# Patient Record
Sex: FEMALE | Race: WHITE | NOT HISPANIC OR LATINO
[De-identification: names, ages, dates, MRNs, and addresses within clinical notes are randomized per-mention and may not be internally consistent; named-entity substitution may affect disease eponyms.]

---

## 2017-02-28 ENCOUNTER — APPOINTMENT (OUTPATIENT)
Dept: ENDOCRINOLOGY | Facility: CLINIC | Age: 57
End: 2017-02-28

## 2017-02-28 VITALS
WEIGHT: 234 LBS | HEART RATE: 81 BPM | BODY MASS INDEX: 40.95 KG/M2 | OXYGEN SATURATION: 96 % | SYSTOLIC BLOOD PRESSURE: 118 MMHG | DIASTOLIC BLOOD PRESSURE: 80 MMHG | HEIGHT: 63.5 IN

## 2017-02-28 LAB
GLUCOSE BLDC GLUCOMTR-MCNC: 203
HBA1C MFR BLD HPLC: 6.4

## 2017-03-03 LAB
25(OH)D3 SERPL-MCNC: 22.1 NG/ML
ALBUMIN SERPL ELPH-MCNC: 4.8 G/DL
ALP BLD-CCNC: 81 U/L
ALT SERPL-CCNC: 27 U/L
ANION GAP SERPL CALC-SCNC: 18 MMOL/L
AST SERPL-CCNC: 17 U/L
BILIRUB SERPL-MCNC: 0.3 MG/DL
BUN SERPL-MCNC: 15 MG/DL
CALCIUM SERPL-MCNC: 10.3 MG/DL
CALCIUM SERPL-MCNC: 10.3 MG/DL
CHLORIDE SERPL-SCNC: 100 MMOL/L
CHOLEST SERPL-MCNC: 186 MG/DL
CHOLEST/HDLC SERPL: 3.9 RATIO
CO2 SERPL-SCNC: 24 MMOL/L
CREAT SERPL-MCNC: 0.82 MG/DL
GLUCOSE SERPL-MCNC: 209 MG/DL
HDLC SERPL-MCNC: 48 MG/DL
LDLC SERPL CALC-MCNC: 108 MG/DL
PARATHYROID HORMONE INTACT: 88 PG/ML
PHOSPHATE SERPL-MCNC: 3.6 MG/DL
POTASSIUM SERPL-SCNC: 4.9 MMOL/L
PROT SERPL-MCNC: 7.6 G/DL
SODIUM SERPL-SCNC: 142 MMOL/L
T4 FREE SERPL-MCNC: 1.4 NG/DL
TRIGL SERPL-MCNC: 149 MG/DL
TSH SERPL-ACNC: 0.87 UIU/ML

## 2017-03-10 ENCOUNTER — RX RENEWAL (OUTPATIENT)
Age: 57
End: 2017-03-10

## 2017-06-01 ENCOUNTER — CLINICAL ADVICE (OUTPATIENT)
Age: 57
End: 2017-06-01

## 2017-07-05 ENCOUNTER — APPOINTMENT (OUTPATIENT)
Dept: ENDOCRINOLOGY | Facility: CLINIC | Age: 57
End: 2017-07-05

## 2017-07-05 VITALS
DIASTOLIC BLOOD PRESSURE: 80 MMHG | SYSTOLIC BLOOD PRESSURE: 118 MMHG | HEART RATE: 84 BPM | WEIGHT: 243 LBS | OXYGEN SATURATION: 98 % | BODY MASS INDEX: 42.52 KG/M2 | HEIGHT: 63.5 IN

## 2017-07-05 LAB
GLUCOSE BLDC GLUCOMTR-MCNC: 197
HBA1C MFR BLD HPLC: 8.5

## 2017-07-07 DIAGNOSIS — L65.9 NONSCARRING HAIR LOSS, UNSPECIFIED: ICD-10-CM

## 2017-07-07 LAB
25(OH)D3 SERPL-MCNC: 24.3 NG/ML
ALBUMIN SERPL ELPH-MCNC: 4.7 G/DL
ALP BLD-CCNC: 89 U/L
ALT SERPL-CCNC: 88 U/L
ANION GAP SERPL CALC-SCNC: 18 MMOL/L
AST SERPL-CCNC: 48 U/L
BASOPHILS # BLD AUTO: 0.01 K/UL
BASOPHILS NFR BLD AUTO: 0.1 %
BILIRUB SERPL-MCNC: 0.4 MG/DL
BUN SERPL-MCNC: 14 MG/DL
CALCIUM SERPL-MCNC: 10.6 MG/DL
CALCIUM SERPL-MCNC: 10.6 MG/DL
CHLORIDE SERPL-SCNC: 98 MMOL/L
CHOLEST SERPL-MCNC: 182 MG/DL
CHOLEST/HDLC SERPL: 3.6 RATIO
CO2 SERPL-SCNC: 23 MMOL/L
CREAT SERPL-MCNC: 0.7 MG/DL
CREAT SPEC-SCNC: 160 MG/DL
EOSINOPHIL # BLD AUTO: 0.25 K/UL
EOSINOPHIL NFR BLD AUTO: 3.4 %
GLUCOSE SERPL-MCNC: 184 MG/DL
HCT VFR BLD CALC: 42.7 %
HDLC SERPL-MCNC: 51 MG/DL
HGB BLD-MCNC: 14 G/DL
IMM GRANULOCYTES NFR BLD AUTO: 0 %
LDLC SERPL CALC-MCNC: 96 MG/DL
LDLC SERPL DIRECT ASSAY-MCNC: 112 MG/DL
LYMPHOCYTES # BLD AUTO: 2.34 K/UL
LYMPHOCYTES NFR BLD AUTO: 32.1 %
MAN DIFF?: NORMAL
MCHC RBC-ENTMCNC: 29.2 PG
MCHC RBC-ENTMCNC: 32.8 GM/DL
MCV RBC AUTO: 89.1 FL
MICROALBUMIN 24H UR DL<=1MG/L-MCNC: 1.1 MG/DL
MICROALBUMIN/CREAT 24H UR-RTO: 7 MG/G
MONOCYTES # BLD AUTO: 0.45 K/UL
MONOCYTES NFR BLD AUTO: 6.2 %
NEUTROPHILS # BLD AUTO: 4.25 K/UL
NEUTROPHILS NFR BLD AUTO: 58.2 %
PARATHYROID HORMONE INTACT: 71 PG/ML
PHOSPHATE SERPL-MCNC: 3.6 MG/DL
PLATELET # BLD AUTO: 221 K/UL
POTASSIUM SERPL-SCNC: 4.8 MMOL/L
PROT SERPL-MCNC: 7.6 G/DL
RBC # BLD: 4.79 M/UL
RBC # FLD: 13.7 %
SODIUM SERPL-SCNC: 139 MMOL/L
T4 FREE SERPL-MCNC: 1.4 NG/DL
TRIGL SERPL-MCNC: 177 MG/DL
TSH SERPL-ACNC: 0.68 UIU/ML
WBC # FLD AUTO: 7.3 K/UL

## 2017-07-14 LAB
DHEA-S SERPL-MCNC: 39.6 UG/DL
TESTOST SERPL-MCNC: 2.6 NG/DL

## 2017-07-17 ENCOUNTER — RX RENEWAL (OUTPATIENT)
Age: 57
End: 2017-07-17

## 2017-08-01 LAB
CAU: 9 MG/DL
CREAT 24H UR-MCNC: 1.3 G/24 H
CREAT ?TM UR-MCNC: 101 MG/DL
PROT ?TM UR-MCNC: 24 HR
SPECIMEN VOL 24H UR: 112 MG/24 H
SPECIMEN VOL 24H UR: 1250 ML

## 2017-09-25 ENCOUNTER — CLINICAL ADVICE (OUTPATIENT)
Age: 57
End: 2017-09-25

## 2017-10-24 ENCOUNTER — RX RENEWAL (OUTPATIENT)
Age: 57
End: 2017-10-24

## 2017-11-03 ENCOUNTER — MEDICATION RENEWAL (OUTPATIENT)
Age: 57
End: 2017-11-03

## 2017-11-14 ENCOUNTER — APPOINTMENT (OUTPATIENT)
Dept: ENDOCRINOLOGY | Facility: CLINIC | Age: 57
End: 2017-11-14
Payer: COMMERCIAL

## 2017-11-14 VITALS
HEIGHT: 63.5 IN | HEART RATE: 97 BPM | BODY MASS INDEX: 43.19 KG/M2 | WEIGHT: 246.8 LBS | SYSTOLIC BLOOD PRESSURE: 110 MMHG | DIASTOLIC BLOOD PRESSURE: 76 MMHG | OXYGEN SATURATION: 98 %

## 2017-11-14 LAB
GLUCOSE BLDC GLUCOMTR-MCNC: 254
HBA1C MFR BLD HPLC: 8.9

## 2017-11-14 PROCEDURE — 82962 GLUCOSE BLOOD TEST: CPT

## 2017-11-14 PROCEDURE — 83036 HEMOGLOBIN GLYCOSYLATED A1C: CPT | Mod: QW

## 2017-11-14 PROCEDURE — 99215 OFFICE O/P EST HI 40 MIN: CPT | Mod: 25

## 2017-11-17 LAB
25(OH)D3 SERPL-MCNC: 19.8 NG/ML
ALBUMIN SERPL ELPH-MCNC: 5.1 G/DL
ALP BLD-CCNC: 105 U/L
ALT SERPL-CCNC: 107 U/L
ANION GAP SERPL CALC-SCNC: 19 MMOL/L
AST SERPL-CCNC: 63 U/L
BASOPHILS # BLD AUTO: 0.03 K/UL
BASOPHILS NFR BLD AUTO: 0.3 %
BILIRUB SERPL-MCNC: 0.4 MG/DL
BUN SERPL-MCNC: 18 MG/DL
CALCIUM SERPL-MCNC: 10.8 MG/DL
CALCIUM SERPL-MCNC: 10.8 MG/DL
CHLORIDE SERPL-SCNC: 99 MMOL/L
CHOLEST SERPL-MCNC: 231 MG/DL
CHOLEST/HDLC SERPL: 4.4 RATIO
CO2 SERPL-SCNC: 23 MMOL/L
CREAT SERPL-MCNC: 0.88 MG/DL
CREAT SPEC-SCNC: 111 MG/DL
EOSINOPHIL # BLD AUTO: 0.22 K/UL
EOSINOPHIL NFR BLD AUTO: 2.2 %
GLUCOSE SERPL-MCNC: 242 MG/DL
HCT VFR BLD CALC: 46.1 %
HDLC SERPL-MCNC: 52 MG/DL
HGB BLD-MCNC: 15.1 G/DL
IMM GRANULOCYTES NFR BLD AUTO: 0.1 %
LDLC SERPL CALC-MCNC: 142 MG/DL
LYMPHOCYTES # BLD AUTO: 2.8 K/UL
LYMPHOCYTES NFR BLD AUTO: 27.9 %
MAN DIFF?: NORMAL
MCHC RBC-ENTMCNC: 30.4 PG
MCHC RBC-ENTMCNC: 32.8 GM/DL
MCV RBC AUTO: 92.9 FL
MICROALBUMIN 24H UR DL<=1MG/L-MCNC: 0.5 MG/DL
MICROALBUMIN/CREAT 24H UR-RTO: 5 MG/G
MONOCYTES # BLD AUTO: 0.6 K/UL
MONOCYTES NFR BLD AUTO: 6 %
NEUTROPHILS # BLD AUTO: 6.38 K/UL
NEUTROPHILS NFR BLD AUTO: 63.5 %
PARATHYROID HORMONE INTACT: 78 PG/ML
PHOSPHATE SERPL-MCNC: 3.6 MG/DL
PLATELET # BLD AUTO: 262 K/UL
POTASSIUM SERPL-SCNC: 5 MMOL/L
PROT SERPL-MCNC: 8.1 G/DL
RBC # BLD: 4.96 M/UL
RBC # FLD: 14 %
SODIUM SERPL-SCNC: 141 MMOL/L
T4 FREE SERPL-MCNC: 1.4 NG/DL
TRIGL SERPL-MCNC: 183 MG/DL
TSH SERPL-ACNC: 0.84 UIU/ML
WBC # FLD AUTO: 10.04 K/UL

## 2017-11-29 ENCOUNTER — CLINICAL ADVICE (OUTPATIENT)
Age: 57
End: 2017-11-29

## 2017-12-05 ENCOUNTER — RX RENEWAL (OUTPATIENT)
Age: 57
End: 2017-12-05

## 2018-01-08 ENCOUNTER — RX RENEWAL (OUTPATIENT)
Age: 58
End: 2018-01-08

## 2018-02-20 ENCOUNTER — RX RENEWAL (OUTPATIENT)
Age: 58
End: 2018-02-20

## 2018-03-28 ENCOUNTER — APPOINTMENT (OUTPATIENT)
Dept: ENDOCRINOLOGY | Facility: CLINIC | Age: 58
End: 2018-03-28
Payer: COMMERCIAL

## 2018-03-28 VITALS — DIASTOLIC BLOOD PRESSURE: 74 MMHG | HEART RATE: 90 BPM | SYSTOLIC BLOOD PRESSURE: 114 MMHG | OXYGEN SATURATION: 98 %

## 2018-03-28 VITALS — BODY MASS INDEX: 41.12 KG/M2 | HEIGHT: 63.25 IN | WEIGHT: 235 LBS

## 2018-03-28 LAB
GLUCOSE BLDC GLUCOMTR-MCNC: 199
HBA1C MFR BLD HPLC: 7.8

## 2018-03-28 PROCEDURE — 82962 GLUCOSE BLOOD TEST: CPT | Mod: GC

## 2018-03-28 PROCEDURE — 99215 OFFICE O/P EST HI 40 MIN: CPT | Mod: 25,GC

## 2018-03-28 PROCEDURE — 83036 HEMOGLOBIN GLYCOSYLATED A1C: CPT | Mod: QW,GC

## 2018-03-28 PROCEDURE — 77080 DXA BONE DENSITY AXIAL: CPT | Mod: GC

## 2018-03-28 PROCEDURE — 95251 CONT GLUC MNTR ANALYSIS I&R: CPT | Mod: GC

## 2018-03-28 PROCEDURE — ZZZZZ: CPT

## 2018-03-29 LAB
25(OH)D3 SERPL-MCNC: 26.8 NG/ML
ALBUMIN SERPL ELPH-MCNC: 4.7 G/DL
ALP BLD-CCNC: 91 U/L
ALT SERPL-CCNC: 78 U/L
ANION GAP SERPL CALC-SCNC: 17 MMOL/L
AST SERPL-CCNC: 45 U/L
BASOPHILS # BLD AUTO: 0.02 K/UL
BASOPHILS NFR BLD AUTO: 0.2 %
BILIRUB SERPL-MCNC: 0.4 MG/DL
BUN SERPL-MCNC: 17 MG/DL
CALCIUM SERPL-MCNC: 11.1 MG/DL
CALCIUM SERPL-MCNC: 11.1 MG/DL
CHLORIDE SERPL-SCNC: 99 MMOL/L
CHOLEST SERPL-MCNC: 275 MG/DL
CHOLEST/HDLC SERPL: 5.5 RATIO
CO2 SERPL-SCNC: 24 MMOL/L
CREAT SERPL-MCNC: 0.73 MG/DL
EOSINOPHIL # BLD AUTO: 0.2 K/UL
EOSINOPHIL NFR BLD AUTO: 2.3 %
GLUCOSE SERPL-MCNC: 170 MG/DL
HCT VFR BLD CALC: 47.9 %
HDLC SERPL-MCNC: 50 MG/DL
HGB BLD-MCNC: 15.9 G/DL
IMM GRANULOCYTES NFR BLD AUTO: 0.1 %
LDLC SERPL CALC-MCNC: 180 MG/DL
LYMPHOCYTES # BLD AUTO: 2.2 K/UL
LYMPHOCYTES NFR BLD AUTO: 25.2 %
MAN DIFF?: NORMAL
MCHC RBC-ENTMCNC: 30.5 PG
MCHC RBC-ENTMCNC: 33.2 GM/DL
MCV RBC AUTO: 91.9 FL
MONOCYTES # BLD AUTO: 0.49 K/UL
MONOCYTES NFR BLD AUTO: 5.6 %
NEUTROPHILS # BLD AUTO: 5.81 K/UL
NEUTROPHILS NFR BLD AUTO: 66.6 %
PARATHYROID HORMONE INTACT: 55 PG/ML
PHOSPHATE SERPL-MCNC: 3.9 MG/DL
PLATELET # BLD AUTO: 255 K/UL
POTASSIUM SERPL-SCNC: 5 MMOL/L
PROT SERPL-MCNC: 7.8 G/DL
RBC # BLD: 5.21 M/UL
RBC # FLD: 14.4 %
SODIUM SERPL-SCNC: 140 MMOL/L
T4 FREE SERPL-MCNC: 1.5 NG/DL
TRIGL SERPL-MCNC: 223 MG/DL
TSH SERPL-ACNC: 0.71 UIU/ML
WBC # FLD AUTO: 8.73 K/UL

## 2018-04-06 RX ORDER — PHENTERMINE HYDROCHLORIDE 15 MG/1
15 CAPSULE ORAL DAILY
Qty: 1 | Refills: 2 | Status: DISCONTINUED | COMMUNITY
Start: 2018-03-28 | End: 2018-04-06

## 2018-04-09 ENCOUNTER — CLINICAL ADVICE (OUTPATIENT)
Age: 58
End: 2018-04-09

## 2018-04-13 ENCOUNTER — MEDICATION RENEWAL (OUTPATIENT)
Age: 58
End: 2018-04-13

## 2018-04-13 ENCOUNTER — RX RENEWAL (OUTPATIENT)
Age: 58
End: 2018-04-13

## 2018-06-12 ENCOUNTER — RX RENEWAL (OUTPATIENT)
Age: 58
End: 2018-06-12

## 2018-07-02 ENCOUNTER — APPOINTMENT (OUTPATIENT)
Dept: ENDOCRINOLOGY | Facility: CLINIC | Age: 58
End: 2018-07-02
Payer: COMMERCIAL

## 2018-07-02 VITALS
SYSTOLIC BLOOD PRESSURE: 112 MMHG | OXYGEN SATURATION: 98 % | WEIGHT: 241 LBS | HEART RATE: 88 BPM | DIASTOLIC BLOOD PRESSURE: 78 MMHG | BODY MASS INDEX: 42.36 KG/M2

## 2018-07-02 LAB
GLUCOSE BLDC GLUCOMTR-MCNC: 115
HBA1C MFR BLD HPLC: 7.4

## 2018-07-02 PROCEDURE — 82962 GLUCOSE BLOOD TEST: CPT

## 2018-07-02 PROCEDURE — 99215 OFFICE O/P EST HI 40 MIN: CPT

## 2018-07-02 PROCEDURE — 83036 HEMOGLOBIN GLYCOSYLATED A1C: CPT | Mod: QW

## 2018-07-03 LAB
25(OH)D3 SERPL-MCNC: 27.7 NG/ML
ALBUMIN SERPL ELPH-MCNC: 4.6 G/DL
ALP BLD-CCNC: 102 U/L
ALT SERPL-CCNC: 53 U/L
ANION GAP SERPL CALC-SCNC: 16 MMOL/L
AST SERPL-CCNC: 34 U/L
BASOPHILS # BLD AUTO: 0.03 K/UL
BASOPHILS NFR BLD AUTO: 0.3 %
BILIRUB SERPL-MCNC: 0.4 MG/DL
BUN SERPL-MCNC: 18 MG/DL
CALCIUM SERPL-MCNC: 10.5 MG/DL
CALCIUM SERPL-MCNC: 10.5 MG/DL
CHLORIDE SERPL-SCNC: 102 MMOL/L
CHOLEST SERPL-MCNC: 172 MG/DL
CHOLEST/HDLC SERPL: 3.5 RATIO
CO2 SERPL-SCNC: 23 MMOL/L
CREAT SERPL-MCNC: 0.83 MG/DL
CREAT SPEC-SCNC: 78 MG/DL
EOSINOPHIL # BLD AUTO: 0.22 K/UL
EOSINOPHIL NFR BLD AUTO: 2 %
GLUCOSE SERPL-MCNC: 109 MG/DL
HCT VFR BLD CALC: 47.8 %
HDLC SERPL-MCNC: 49 MG/DL
HGB BLD-MCNC: 15.4 G/DL
IMM GRANULOCYTES NFR BLD AUTO: 0.1 %
LDLC SERPL CALC-MCNC: 90 MG/DL
LYMPHOCYTES # BLD AUTO: 2.75 K/UL
LYMPHOCYTES NFR BLD AUTO: 25.2 %
MAN DIFF?: NORMAL
MCHC RBC-ENTMCNC: 29.2 PG
MCHC RBC-ENTMCNC: 32.2 GM/DL
MCV RBC AUTO: 90.5 FL
MICROALBUMIN 24H UR DL<=1MG/L-MCNC: 1 MG/DL
MICROALBUMIN/CREAT 24H UR-RTO: 13 MG/G
MONOCYTES # BLD AUTO: 0.66 K/UL
MONOCYTES NFR BLD AUTO: 6 %
NEUTROPHILS # BLD AUTO: 7.26 K/UL
NEUTROPHILS NFR BLD AUTO: 66.4 %
PARATHYROID HORMONE INTACT: 99 PG/ML
PHOSPHATE SERPL-MCNC: 3.8 MG/DL
PLATELET # BLD AUTO: 258 K/UL
POTASSIUM SERPL-SCNC: 5.3 MMOL/L
PROT SERPL-MCNC: 8.4 G/DL
RBC # BLD: 5.28 M/UL
RBC # FLD: 14.2 %
SODIUM SERPL-SCNC: 141 MMOL/L
T4 FREE SERPL-MCNC: 1.5 NG/DL
TRIGL SERPL-MCNC: 166 MG/DL
TSH SERPL-ACNC: 0.98 UIU/ML
WBC # FLD AUTO: 10.93 K/UL

## 2018-09-03 ENCOUNTER — RX RENEWAL (OUTPATIENT)
Age: 58
End: 2018-09-03

## 2018-09-28 ENCOUNTER — MEDICATION RENEWAL (OUTPATIENT)
Age: 58
End: 2018-09-28

## 2018-09-28 ENCOUNTER — CLINICAL ADVICE (OUTPATIENT)
Age: 58
End: 2018-09-28

## 2018-09-28 RX ORDER — EMPAGLIFLOZIN 25 MG/1
25 TABLET, FILM COATED ORAL DAILY
Qty: 90 | Refills: 3 | Status: DISCONTINUED | COMMUNITY
Start: 2017-11-29 | End: 2018-09-28

## 2018-10-19 ENCOUNTER — CLINICAL ADVICE (OUTPATIENT)
Age: 58
End: 2018-10-19

## 2018-11-19 ENCOUNTER — APPOINTMENT (OUTPATIENT)
Dept: ENDOCRINOLOGY | Facility: CLINIC | Age: 58
End: 2018-11-19
Payer: COMMERCIAL

## 2018-11-19 ENCOUNTER — RESULT CHARGE (OUTPATIENT)
Age: 58
End: 2018-11-19

## 2018-11-19 VITALS
HEIGHT: 63.25 IN | WEIGHT: 243 LBS | SYSTOLIC BLOOD PRESSURE: 124 MMHG | OXYGEN SATURATION: 97 % | DIASTOLIC BLOOD PRESSURE: 80 MMHG | BODY MASS INDEX: 42.52 KG/M2 | HEART RATE: 102 BPM

## 2018-11-19 LAB — GLUCOSE BLDC GLUCOMTR-MCNC: 205

## 2018-11-19 PROCEDURE — 99214 OFFICE O/P EST MOD 30 MIN: CPT | Mod: 25

## 2018-11-19 PROCEDURE — 82962 GLUCOSE BLOOD TEST: CPT

## 2018-11-19 PROCEDURE — 83036 HEMOGLOBIN GLYCOSYLATED A1C: CPT | Mod: QW

## 2018-11-19 RX ORDER — FLASH GLUCOSE SCANNING READER
EACH MISCELLANEOUS
Qty: 1 | Refills: 0 | Status: ACTIVE | COMMUNITY
Start: 2018-11-19 | End: 1900-01-01

## 2018-11-20 LAB
25(OH)D3 SERPL-MCNC: 29.8 NG/ML
ALBUMIN SERPL ELPH-MCNC: 4.5 G/DL
ALP BLD-CCNC: 108 U/L
ALT SERPL-CCNC: 63 U/L
ANION GAP SERPL CALC-SCNC: 13 MMOL/L
AST SERPL-CCNC: 31 U/L
BASOPHILS # BLD AUTO: 0.02 K/UL
BASOPHILS NFR BLD AUTO: 0.2 %
BILIRUB SERPL-MCNC: 0.3 MG/DL
BUN SERPL-MCNC: 12 MG/DL
CALCIUM SERPL-MCNC: 10.5 MG/DL
CALCIUM SERPL-MCNC: 10.5 MG/DL
CHLORIDE SERPL-SCNC: 100 MMOL/L
CHOLEST SERPL-MCNC: 159 MG/DL
CHOLEST/HDLC SERPL: 3.3 RATIO
CO2 SERPL-SCNC: 27 MMOL/L
CREAT SERPL-MCNC: 0.73 MG/DL
CREAT SPEC-SCNC: 182 MG/DL
EOSINOPHIL # BLD AUTO: 0.26 K/UL
EOSINOPHIL NFR BLD AUTO: 2.8 %
GLUCOSE SERPL-MCNC: 185 MG/DL
HCT VFR BLD CALC: 44.3 %
HDLC SERPL-MCNC: 48 MG/DL
HGB BLD-MCNC: 14.4 G/DL
IMM GRANULOCYTES NFR BLD AUTO: 0.1 %
LDLC SERPL CALC-MCNC: 76 MG/DL
LYMPHOCYTES # BLD AUTO: 2.6 K/UL
LYMPHOCYTES NFR BLD AUTO: 28.4 %
MAN DIFF?: NORMAL
MCHC RBC-ENTMCNC: 29.4 PG
MCHC RBC-ENTMCNC: 32.5 GM/DL
MCV RBC AUTO: 90.6 FL
MICROALBUMIN 24H UR DL<=1MG/L-MCNC: <1.2 MG/DL
MICROALBUMIN/CREAT 24H UR-RTO: NORMAL
MONOCYTES # BLD AUTO: 0.52 K/UL
MONOCYTES NFR BLD AUTO: 5.7 %
NEUTROPHILS # BLD AUTO: 5.75 K/UL
NEUTROPHILS NFR BLD AUTO: 62.8 %
PARATHYROID HORMONE INTACT: 44 PG/ML
PHOSPHATE SERPL-MCNC: 4.5 MG/DL
PLATELET # BLD AUTO: 235 K/UL
POTASSIUM SERPL-SCNC: 5 MMOL/L
PROT SERPL-MCNC: 7 G/DL
RBC # BLD: 4.89 M/UL
RBC # FLD: 13.5 %
SODIUM SERPL-SCNC: 140 MMOL/L
T4 FREE SERPL-MCNC: 1.5 NG/DL
TRIGL SERPL-MCNC: 175 MG/DL
TSH SERPL-ACNC: 0.53 UIU/ML
WBC # FLD AUTO: 9.16 K/UL

## 2018-11-21 LAB — HBA1C MFR BLD HPLC: 9.4

## 2018-11-29 ENCOUNTER — APPOINTMENT (OUTPATIENT)
Dept: ENDOCRINOLOGY | Facility: CLINIC | Age: 58
End: 2018-11-29
Payer: COMMERCIAL

## 2018-11-29 PROCEDURE — 95249 CONT GLUC MNTR PT PROV EQP: CPT

## 2019-01-25 ENCOUNTER — CLINICAL ADVICE (OUTPATIENT)
Age: 59
End: 2019-01-25

## 2019-02-04 ENCOUNTER — CLINICAL ADVICE (OUTPATIENT)
Age: 59
End: 2019-02-04

## 2019-02-04 ENCOUNTER — MEDICATION RENEWAL (OUTPATIENT)
Age: 59
End: 2019-02-04

## 2019-02-13 RX ORDER — FLASH GLUCOSE SENSOR
KIT MISCELLANEOUS
Qty: 7 | Refills: 3 | Status: ACTIVE | COMMUNITY
Start: 2018-11-19

## 2019-02-25 ENCOUNTER — APPOINTMENT (OUTPATIENT)
Dept: ENDOCRINOLOGY | Facility: CLINIC | Age: 59
End: 2019-02-25
Payer: COMMERCIAL

## 2019-02-25 VITALS
OXYGEN SATURATION: 97 % | BODY MASS INDEX: 42 KG/M2 | WEIGHT: 240 LBS | DIASTOLIC BLOOD PRESSURE: 80 MMHG | HEART RATE: 84 BPM | SYSTOLIC BLOOD PRESSURE: 120 MMHG | HEIGHT: 63.25 IN

## 2019-02-25 LAB
BASOPHILS # BLD AUTO: 0.03 K/UL
BASOPHILS NFR BLD AUTO: 0.4 %
EOSINOPHIL # BLD AUTO: 0.19 K/UL
EOSINOPHIL NFR BLD AUTO: 2.4 %
GLUCOSE BLDC GLUCOMTR-MCNC: 222
HBA1C MFR BLD HPLC: 8.5
HCT VFR BLD CALC: 45.4 %
HGB BLD-MCNC: 14.4 G/DL
IMM GRANULOCYTES NFR BLD AUTO: 0.3 %
LYMPHOCYTES # BLD AUTO: 2.14 K/UL
LYMPHOCYTES NFR BLD AUTO: 27.3 %
MAN DIFF?: NORMAL
MCHC RBC-ENTMCNC: 29.2 PG
MCHC RBC-ENTMCNC: 31.7 GM/DL
MCV RBC AUTO: 92.1 FL
MONOCYTES # BLD AUTO: 0.41 K/UL
MONOCYTES NFR BLD AUTO: 5.2 %
NEUTROPHILS # BLD AUTO: 5.06 K/UL
NEUTROPHILS NFR BLD AUTO: 64.4 %
PLATELET # BLD AUTO: 258 K/UL
RBC # BLD: 4.93 M/UL
RBC # FLD: 13.6 %
WBC # FLD AUTO: 7.85 K/UL

## 2019-02-25 PROCEDURE — 83036 HEMOGLOBIN GLYCOSYLATED A1C: CPT | Mod: QW

## 2019-02-25 PROCEDURE — 99214 OFFICE O/P EST MOD 30 MIN: CPT | Mod: 25

## 2019-02-25 PROCEDURE — 95251 CONT GLUC MNTR ANALYSIS I&R: CPT

## 2019-02-25 NOTE — ASSESSMENT
[Carbohydrate Consistent Diet] : carbohydrate consistent diet [Long Term Vascular Complications] : long term vascular complications of diabetes [Importance of Diet and Exercise] : importance of diet and exercise to improve glycemic control, achieve weight loss and improve cardiovascular health [FreeTextEntry1] : 58 y.o. female with h/o Type 2 DM, hyperlipidemia, hypothyroidism and thyroid nodules.\par 1. Type 2 DM- Poor control with Hba1c of 8.5%. Encouraged carbohydrate consistent diet and exercise. Agree with meeting nutritionist. Freestyle Addison download reviewed and there is postprandial hyperglycemia. Will continue Humalog 12 units QAC ( since patient plans to reduce carb intake) and will continue Lantus 44 units daily. Will remain off Jardiance given yeast infection. Encouraged increase in use of Freestyle Addison to help improve control.  Will check CMP and urine microalb/cr ratio. \par 2. BP is at goal and normal urine microalb/cr ratio\par 3. Hyperlipidemia- Will check lipids and will continue statin.\par 4. Hypothyroidism- Will check TFTs and adjust LT4 accordingly\par 5. Thyroid nodules- Will monitor for now and repeat thyroid ultrasound in 2020.\par 6. Vitamin D def/Primary hyperparathyroidism- Will check serum calcium, phosphorus, intact PTH and 25 vitamin D level. Will continue vitamin D3 2,000 Iu daily.\par 7. Obesity- Encouraged exercise and portion control. \par 8. Osteopenia- Average risk of fracture. Will monitor for now. Recommend repeat DEXA scan in 2020. Encouraged weight bearing activity. Will check 25 vitamin D level. \par \par Follow up in 3-4 months\par Already received flu vaccine

## 2019-02-25 NOTE — HISTORY OF PRESENT ILLNESS
[___] : [unfilled] [FreeTextEntry1] : 58 y.o. female with h/o Type 2 DM, hypothyroidism, hyperlipidemia here for follow up visit. No acute events since last visit.\par \par Seeing ENT tomorrow. C/o fatigue. Was doing better with diet but off track in the last 2 weeks. Taking Humalog 12 units QAC  and Lantus 44 units QHS. Off Jardiance 25 mg daily because of yeast infection.  Saw GYN and treated with Diflucan and also cream. Intolerant to Metformin and Trulicity in the past. Stopped Phentermine. Using Freestyle Addison now. States she feels symptoms of hypoglycemia when FS are 130s. UTD with optho and no retinopathy. No neuropathy. Follows with podiatry.  Following with GI for fatty liver. No exercise but walking. Knee pain is better now, saw orthopedics. Diet- AM- egg whites, turkey, lunch- doesn't eat, dinner- chicken, fish. Snacks on pumpkin seeds.  Seeing nutritionist now. \par \par Hyperlipidemia- taking  Lipitor\par \par Regarding thyroid disease, no neck complaints. Taking LT4 88 mcg daily.  Last sonogram in January 2019 showed stable b/l subcentimeter thyroid nodules with largest in the left lower pole which is 1.2 cm. C/o raspy voice. \par \par Also h/o hypercalcemia most likely hyperparathyroidism. No h/o kidney stones. No polyuria and polydipsia.  Taking vitamin D 2,000 Iu daily. Had DEXA scan in March 2017 with GYN which showed spine -0.1 and femoral neck -0.7. Repeat DXA in March 2018 showed fem neck -1.8, spine -0.5. No falls or fractures.\par \par Saw dermatology for hair loss. Hair loss is androgenic in origin. Hair loss is stable and taking MVI.  [Hypoglycemia] : not hypoglycemic

## 2019-02-25 NOTE — PHYSICAL EXAM
[Alert] : alert [No Acute Distress] : no acute distress [Normal Sclera/Conjunctiva] : normal sclera/conjunctiva [EOMI] : extra ocular movement intact [Supple] : the neck was supple [No LAD] : no lymphadenopathy [Thyroid Not Enlarged] : the thyroid was not enlarged [No Accessory Muscle Use] : no accessory muscle use [Clear to Auscultation] : lungs were clear to auscultation bilaterally [Normal S1, S2] : normal S1 and S2 [Regular Rhythm] : with a regular rhythm [Pedal Pulses Normal] : the pedal pulses are present [No Edema] : there was no peripheral edema [Normal Bowel Sounds] : normal bowel sounds [Not Tender] : non-tender [Soft] : abdomen soft [Not Distended] : not distended [No Clubbing, Cyanosis] : no clubbing  or cyanosis of the fingernails [No Rash] : no rash [Right Foot Was Examined] : right foot ~C was examined [Left Foot Was Examined] : left foot ~C was examined [Normal] : normal [2+] : 2+ in the dorsalis pedis [Normal Reflexes] : deep tendon reflexes were 2+ and symmetric [Normal Affect] : the affect was normal [Normal Mood] : the mood was normal [Diminished Throughout Both Feet] : normal tactile sensation with monofilament testing throughout both feet

## 2019-02-25 NOTE — REVIEW OF SYSTEMS
[Fatigue] : fatigue [Recent Weight Loss (___ Lbs)] : recent [unfilled] ~Ulb weight loss [Stress] : stress [Negative] : Gastrointestinal [All other systems negative] : All other systems negative [Dysphagia] : no dysphagia [Shortness Of Breath] : no shortness of breath [Wheezing] : no wheezing was heard [Polyuria] : no polyuria [Pain/Numbness of Digits] : no pain/numbness of digits [Swelling] : no swelling

## 2019-02-27 LAB
25(OH)D3 SERPL-MCNC: 29 NG/ML
APPEARANCE: CLEAR
BACTERIA: ABNORMAL
BILIRUBIN URINE: NEGATIVE
BLOOD URINE: NEGATIVE
CALCIUM SERPL-MCNC: 9.2 MG/DL
COLOR: YELLOW
GLUCOSE QUALITATIVE U: ABNORMAL
HYALINE CASTS: 0 /LPF
KETONES URINE: NEGATIVE
LEUKOCYTE ESTERASE URINE: NEGATIVE
MICROSCOPIC-UA: NORMAL
NITRITE URINE: NEGATIVE
PARATHYROID HORMONE INTACT: 82 PG/ML
PH URINE: 6
PROTEIN URINE: NORMAL
RED BLOOD CELLS URINE: 1 /HPF
SPECIFIC GRAVITY URINE: 1.03
SQUAMOUS EPITHELIAL CELLS: 7 /HPF
T4 FREE SERPL-MCNC: 1.3 NG/DL
TSH SERPL-ACNC: 1.36 UIU/ML
UROBILINOGEN URINE: NORMAL
WHITE BLOOD CELLS URINE: 6 /HPF

## 2019-03-01 LAB
CHOLEST SERPL-MCNC: 201 MG/DL
CHOLEST/HDLC SERPL: 4.4 RATIO
HDLC SERPL-MCNC: 46 MG/DL
LDLC SERPL CALC-MCNC: 121 MG/DL
PHOSPHATE SERPL-MCNC: 3.6 MG/DL
TRIGL SERPL-MCNC: 172 MG/DL

## 2019-03-04 LAB
ALBUMIN SERPL ELPH-MCNC: 4.8 G/DL
ALP BLD-CCNC: 96 U/L
ALT SERPL-CCNC: 38 U/L
AST SERPL-CCNC: 21 U/L
BILIRUB SERPL-MCNC: 0.4 MG/DL
BUN SERPL-MCNC: 20 MG/DL
CALCIUM SERPL-MCNC: 9.2 MG/DL
CHLORIDE SERPL-SCNC: 100 MMOL/L
CO2 SERPL-SCNC: 40 MMOL/L
CREAT SERPL-MCNC: 0.77 MG/DL
GLUCOSE SERPL-MCNC: 239 MG/DL
POTASSIUM SERPL-SCNC: 5 MMOL/L
PROT SERPL-MCNC: 7.3 G/DL
SODIUM SERPL-SCNC: 140 MMOL/L

## 2019-03-28 LAB
ALBUMIN SERPL ELPH-MCNC: 4.2 G/DL
ALP BLD-CCNC: 90 U/L
ALT SERPL-CCNC: 33 U/L
ANION GAP SERPL CALC-SCNC: 10 MMOL/L
AST SERPL-CCNC: 19 U/L
BILIRUB SERPL-MCNC: 0.3 MG/DL
BUN SERPL-MCNC: 13 MG/DL
CALCIUM SERPL-MCNC: 9.9 MG/DL
CHLORIDE SERPL-SCNC: 102 MMOL/L
CHOLEST SERPL-MCNC: 166 MG/DL
CHOLEST/HDLC SERPL: 3.5 RATIO
CO2 SERPL-SCNC: 28 MMOL/L
CREAT SERPL-MCNC: 0.81 MG/DL
GLUCOSE SERPL-MCNC: 204 MG/DL
HDLC SERPL-MCNC: 48 MG/DL
LDLC SERPL CALC-MCNC: 84 MG/DL
POTASSIUM SERPL-SCNC: 5 MMOL/L
PROT SERPL-MCNC: 6.6 G/DL
SODIUM SERPL-SCNC: 140 MMOL/L
TRIGL SERPL-MCNC: 172 MG/DL

## 2019-04-08 ENCOUNTER — RX RENEWAL (OUTPATIENT)
Age: 59
End: 2019-04-08

## 2019-05-17 ENCOUNTER — CLINICAL ADVICE (OUTPATIENT)
Age: 59
End: 2019-05-17

## 2019-06-02 ENCOUNTER — RX RENEWAL (OUTPATIENT)
Age: 59
End: 2019-06-02

## 2019-06-26 ENCOUNTER — APPOINTMENT (OUTPATIENT)
Dept: ENDOCRINOLOGY | Facility: CLINIC | Age: 59
End: 2019-06-26
Payer: COMMERCIAL

## 2019-06-26 VITALS
OXYGEN SATURATION: 98 % | SYSTOLIC BLOOD PRESSURE: 120 MMHG | HEIGHT: 63.25 IN | HEART RATE: 86 BPM | BODY MASS INDEX: 39.9 KG/M2 | WEIGHT: 228 LBS | DIASTOLIC BLOOD PRESSURE: 70 MMHG

## 2019-06-26 LAB
GLUCOSE BLDC GLUCOMTR-MCNC: 214
HBA1C MFR BLD HPLC: 7.6

## 2019-06-26 PROCEDURE — 83036 HEMOGLOBIN GLYCOSYLATED A1C: CPT | Mod: QW

## 2019-06-26 PROCEDURE — 95251 CONT GLUC MNTR ANALYSIS I&R: CPT

## 2019-06-26 PROCEDURE — 99214 OFFICE O/P EST MOD 30 MIN: CPT | Mod: 25

## 2019-06-27 NOTE — PHYSICAL EXAM
[Alert] : alert [No Acute Distress] : no acute distress [Normal Sclera/Conjunctiva] : normal sclera/conjunctiva [EOMI] : extra ocular movement intact [Supple] : the neck was supple [No LAD] : no lymphadenopathy [Thyroid Not Enlarged] : the thyroid was not enlarged [No Accessory Muscle Use] : no accessory muscle use [Clear to Auscultation] : lungs were clear to auscultation bilaterally [Regular Rhythm] : with a regular rhythm [Normal S1, S2] : normal S1 and S2 [Pedal Pulses Normal] : the pedal pulses are present [No Edema] : there was no peripheral edema [Normal Bowel Sounds] : normal bowel sounds [Not Tender] : non-tender [Soft] : abdomen soft [Not Distended] : not distended [No Clubbing, Cyanosis] : no clubbing  or cyanosis of the fingernails [No Rash] : no rash [Right Foot Was Examined] : right foot ~C was examined [Left Foot Was Examined] : left foot ~C was examined [Normal] : normal [2+] : 2+ in the dorsalis pedis [Normal Reflexes] : deep tendon reflexes were 2+ and symmetric [Normal Affect] : the affect was normal [Normal Mood] : the mood was normal [Diminished Throughout Both Feet] : normal tactile sensation with monofilament testing throughout both feet

## 2019-06-27 NOTE — HISTORY OF PRESENT ILLNESS
[___] : [unfilled] [FreeTextEntry1] : 59 y.o. female with h/o Type 2 DM, hypothyroidism, hyperlipidemia here for follow up visit. No acute events since last visit.\par \par  C/o fatigue. Has been doing better with diet called [employer]. Losing weight since last visit. Eating 5 shant every 3 hours with one lean green meal. Taking Humalog 12 units QAC  and Lantus 22 units QHS which she reduced. Off Jardiance 25 mg daily because of yeast infection. Intolerant to Metformin and Trulicity in the past. Stopped Phentermine. Using Freestyle Addison now. States she feels symptoms of hypoglycemia. UTD with optho (May 2019) and no retinopathy. No neuropathy. Follows with podiatry.  Following with GI for fatty liver. No exercise but walking. Planning to use pool. Knee pain is better now, saw orthopedics.  Seeing nutritionist through [employer].\par \par Hyperlipidemia- taking  Lipitor\par \par Regarding thyroid disease, no neck complaints. Taking LT4 88 mcg daily.  Last sonogram in January 2019 showed stable b/l subcentimeter thyroid nodules with largest in the left lower pole which is 1.2 cm. C/o raspy voice. \par \par Also h/o hypercalcemia most likely primary hyperparathyroidism. No h/o kidney stones. No polyuria and polydipsia.  Taking vitamin D 2,000 Iu daily. Had DEXA scan in March 2017 with GYN which showed spine -0.1 and femoral neck -0.7. Repeat DXA in March 2018 showed femoral neck -1.8, total hip -1.0, 1/3 radius -0.8 and spine -0.5. No falls or fractures.\par \par Saw dermatology for hair loss. Hair loss is androgenic in origin. Hair loss is stable and taking MVI.  [Hypoglycemia] : not hypoglycemic

## 2019-06-27 NOTE — ASSESSMENT
[Carbohydrate Consistent Diet] : carbohydrate consistent diet [Long Term Vascular Complications] : long term vascular complications of diabetes [Importance of Diet and Exercise] : importance of diet and exercise to improve glycemic control, achieve weight loss and improve cardiovascular health [FreeTextEntry1] : 59 y.o. female with h/o Type 2 DM, hyperlipidemia, hypothyroidism and thyroid nodules.\par 1. Type 2 DM- Fair control with Hba1c of 7.6%. Encouraged carbohydrate consistent diet and exercise. Agree with meeting nutritionist. Freestyle Addison download reviewed and blood glucose levels are in range. Given low carb diet, will discontinue Humalog 12 units QAC and will continue Lantus 22 units daily. Will start Januvia 100 mg to avoid postprandial hyperglycemia. Encouraged patient to forward Freestyle Addison download.  Will check CMP and urine microalb/cr ratio. \par 2. BP is at goal and normal urine microalb/cr ratio in 2/2019\par 3. Hyperlipidemia- Will check lipids and will continue statin.\par 4. Hypothyroidism- Will check TFTs and adjust LT4 accordingly\par 5. Thyroid nodules- Will monitor for now and repeat thyroid ultrasound in 2020.\par 6. Vitamin D def/Primary hyperparathyroidism- Will check serum calcium, phosphorus, intact PTH and 25 vitamin D level. Will continue vitamin D3 2,000 Iu daily.\par 7. Obesity- Encouraged exercise and portion control. \par 8. Osteopenia- Average risk of fracture. Will monitor for now. Recommend repeat DEXA scan in 2020. Encouraged weight bearing activity. Will check 25 vitamin D level. \par \par Follow up in 3-4 months

## 2019-06-27 NOTE — REVIEW OF SYSTEMS
[Fatigue] : fatigue [Recent Weight Loss (___ Lbs)] : recent [unfilled] ~Ulb weight loss [Stress] : stress [Negative] : Gastrointestinal [All other systems negative] : All other systems negative [Dysphagia] : no dysphagia [Shortness Of Breath] : no shortness of breath [Polyuria] : no polyuria [Wheezing] : no wheezing was heard [Joint Pain] : joint pain [Hair Loss] : hair loss [Pain/Numbness of Digits] : no pain/numbness of digits [Heat Intolerance] : heat intolerant [Swelling] : no swelling

## 2019-06-28 LAB
25(OH)D3 SERPL-MCNC: 35.7 NG/ML
ALBUMIN SERPL ELPH-MCNC: 5 G/DL
ALP BLD-CCNC: 83 U/L
ALT SERPL-CCNC: 55 U/L
ANION GAP SERPL CALC-SCNC: 15 MMOL/L
AST SERPL-CCNC: 30 U/L
BASOPHILS # BLD AUTO: 0.03 K/UL
BASOPHILS NFR BLD AUTO: 0.6 %
BILIRUB SERPL-MCNC: 0.5 MG/DL
BUN SERPL-MCNC: 18 MG/DL
CALCIUM SERPL-MCNC: 10.5 MG/DL
CALCIUM SERPL-MCNC: 10.5 MG/DL
CHLORIDE SERPL-SCNC: 100 MMOL/L
CHOLEST SERPL-MCNC: 133 MG/DL
CHOLEST/HDLC SERPL: 3 RATIO
CO2 SERPL-SCNC: 26 MMOL/L
CREAT SERPL-MCNC: 0.83 MG/DL
CREAT SPEC-SCNC: 125 MG/DL
EOSINOPHIL # BLD AUTO: 0.2 K/UL
EOSINOPHIL NFR BLD AUTO: 3.7 %
GLUCOSE SERPL-MCNC: 211 MG/DL
HCT VFR BLD CALC: 45 %
HDLC SERPL-MCNC: 45 MG/DL
HGB BLD-MCNC: 14.1 G/DL
IMM GRANULOCYTES NFR BLD AUTO: 0.2 %
LDLC SERPL CALC-MCNC: 58 MG/DL
LYMPHOCYTES # BLD AUTO: 2 K/UL
LYMPHOCYTES NFR BLD AUTO: 37 %
MAN DIFF?: NORMAL
MCHC RBC-ENTMCNC: 29.4 PG
MCHC RBC-ENTMCNC: 31.3 GM/DL
MCV RBC AUTO: 93.9 FL
MICROALBUMIN 24H UR DL<=1MG/L-MCNC: <1.2 MG/DL
MICROALBUMIN/CREAT 24H UR-RTO: NORMAL MG/G
MONOCYTES # BLD AUTO: 0.39 K/UL
MONOCYTES NFR BLD AUTO: 7.2 %
NEUTROPHILS # BLD AUTO: 2.77 K/UL
NEUTROPHILS NFR BLD AUTO: 51.3 %
PARATHYROID HORMONE INTACT: 67 PG/ML
PHOSPHATE SERPL-MCNC: 3.3 MG/DL
PLATELET # BLD AUTO: 228 K/UL
POTASSIUM SERPL-SCNC: 5.3 MMOL/L
PROT SERPL-MCNC: 7.5 G/DL
RBC # BLD: 4.79 M/UL
RBC # FLD: 14 %
SODIUM SERPL-SCNC: 140 MMOL/L
T4 FREE SERPL-MCNC: 1.4 NG/DL
TRIGL SERPL-MCNC: 149 MG/DL
TSH SERPL-ACNC: 0.62 UIU/ML
WBC # FLD AUTO: 5.4 K/UL

## 2019-09-20 ENCOUNTER — RX RENEWAL (OUTPATIENT)
Age: 59
End: 2019-09-20

## 2019-09-24 ENCOUNTER — MEDICATION RENEWAL (OUTPATIENT)
Age: 59
End: 2019-09-24

## 2019-10-30 ENCOUNTER — APPOINTMENT (OUTPATIENT)
Dept: ENDOCRINOLOGY | Facility: CLINIC | Age: 59
End: 2019-10-30
Payer: COMMERCIAL

## 2019-10-30 VITALS
OXYGEN SATURATION: 97 % | BODY MASS INDEX: 34.83 KG/M2 | SYSTOLIC BLOOD PRESSURE: 120 MMHG | HEART RATE: 77 BPM | WEIGHT: 199.05 LBS | HEIGHT: 63.25 IN | DIASTOLIC BLOOD PRESSURE: 80 MMHG

## 2019-10-30 LAB
GLUCOSE BLDC GLUCOMTR-MCNC: 121
HBA1C MFR BLD HPLC: 5.7

## 2019-10-30 PROCEDURE — 83036 HEMOGLOBIN GLYCOSYLATED A1C: CPT | Mod: QW

## 2019-10-30 PROCEDURE — 82962 GLUCOSE BLOOD TEST: CPT

## 2019-10-30 PROCEDURE — 99214 OFFICE O/P EST MOD 30 MIN: CPT | Mod: 25

## 2019-10-30 PROCEDURE — 95251 CONT GLUC MNTR ANALYSIS I&R: CPT

## 2019-10-30 RX ORDER — INSULIN LISPRO 100 [IU]/ML
100 INJECTION, SOLUTION INTRAVENOUS; SUBCUTANEOUS
Qty: 3 | Refills: 3 | Status: DISCONTINUED | COMMUNITY
Start: 2018-09-28 | End: 2019-10-30

## 2019-10-30 NOTE — ASSESSMENT
[FreeTextEntry1] : 59 y.o. female with h/o Type 2 DM, hyperlipidemia, hypothyroidism and thyroid nodules.\par 1. Type 2 DM- Excellent control with Hba1c of 5.7% today. Encouraged carbohydrate consistent diet and exercise. Freestyle Addison download reviewed and blood glucose levels are in range. Given low carb diet, will discontinue Lantus and will continue Januvia 100 mg daily. Encouraged patient to forward Freestyle Addison download.  Will check CMP and urine microalb/cr ratio. \par 2. BP is at goal and normal urine microalb/cr ratio in 2/2019\par 3. Hyperlipidemia- Will check lipids and will continue statin.\par 4. Hypothyroidism- Will check TFTs and adjust LT4 accordingly\par 5. Thyroid nodules- Will monitor for now and repeat thyroid ultrasound in 2020.\par 6. Vitamin D def/Primary hyperparathyroidism- Will check serum calcium, phosphorus, intact PTH and 25 vitamin D level. Will continue vitamin D3 2,000 Iu daily.\par 7. Obesity- Encouraged exercise and portion control. \par 8. Osteopenia- Average risk of fracture. Will monitor for now. Recommend repeat DEXA scan in 2020. Encouraged weight bearing activity. Will check 25 vitamin D level. \par \par Follow up in 3-4 months [Carbohydrate Consistent Diet] : carbohydrate consistent diet [Long Term Vascular Complications] : long term vascular complications of diabetes [Importance of Diet and Exercise] : importance of diet and exercise to improve glycemic control, achieve weight loss and improve cardiovascular health

## 2019-10-30 NOTE — PHYSICAL EXAM
[Alert] : alert [No Acute Distress] : no acute distress [Normal Sclera/Conjunctiva] : normal sclera/conjunctiva [EOMI] : extra ocular movement intact [Supple] : the neck was supple [No LAD] : no lymphadenopathy [Thyroid Not Enlarged] : the thyroid was not enlarged [No Accessory Muscle Use] : no accessory muscle use [Clear to Auscultation] : lungs were clear to auscultation bilaterally [Normal S1, S2] : normal S1 and S2 [Regular Rhythm] : with a regular rhythm [Pedal Pulses Normal] : the pedal pulses are present [No Edema] : there was no peripheral edema [Normal Bowel Sounds] : normal bowel sounds [Not Tender] : non-tender [Soft] : abdomen soft [Not Distended] : not distended [No Clubbing, Cyanosis] : no clubbing  or cyanosis of the fingernails [No Rash] : no rash [Right Foot Was Examined] : right foot ~C was examined [Left Foot Was Examined] : left foot ~C was examined [Normal] : normal [2+] : 2+ in the dorsalis pedis [Diminished Throughout Both Feet] : normal tactile sensation with monofilament testing throughout both feet [Normal Reflexes] : deep tendon reflexes were 2+ and symmetric [Normal Affect] : the affect was normal [Normal Mood] : the mood was normal

## 2019-10-30 NOTE — REVIEW OF SYSTEMS
[Fatigue] : no fatigue [Recent Weight Loss (___ Lbs)] : recent [unfilled] ~Ulb weight loss [Dysphagia] : no dysphagia [Shortness Of Breath] : no shortness of breath [Wheezing] : no wheezing was heard [Polyuria] : no polyuria [Joint Pain] : joint pain [Hair Loss] : no hair loss [Pain/Numbness of Digits] : no pain/numbness of digits [Stress] : stress [Heat Intolerance] : heat intolerant [Swelling] : no swelling [Negative] : Gastrointestinal [All other systems negative] : All other systems negative

## 2019-10-30 NOTE — HISTORY OF PRESENT ILLNESS
[FreeTextEntry1] : 59 y.o. female with h/o Type 2 DM, hypothyroidism, hyperlipidemia here for follow up visit. No acute events since last visit.\par \par  No fatigue. Has been doing better with diet called [employer]. Losing more weight since last visit. Eating 5 shant every 3 hours with one lean green meal. Taking Lantus 9 units QHS and Januvia 100 mg daily. Off Jardiance 25 mg daily because of yeast infection. Intolerant to Metformin and Trulicity in the past. Stopped Phentermine. Using Freestyle Addison now. States she feels symptoms of hypoglycemia. UTD with optho (May 2019) and no retinopathy. No neuropathy. Follows with podiatry.  Following with GI for fatty liver. Doing pillates. Knee pain is better now, saw orthopedics. Doing PT. Seeing nutritionist through [employer].\par \par Hyperlipidemia- taking  Lipitor\par \par Regarding thyroid disease, no neck complaints. Taking LT4 88 mcg daily.  Last sonogram in January 2019 showed stable b/l subcentimeter thyroid nodules with largest in the left lower pole which is 1.2 cm. C/o raspy voice. \par \par Also h/o hypercalcemia most likely primary hyperparathyroidism. No h/o kidney stones. No polyuria and polydipsia.  Taking vitamin D 2,000 Iu daily. Had DEXA scan in March 2017 with GYN which showed spine -0.1 and femoral neck -0.7. Repeat DXA in March 2018 showed femoral neck -1.8, total hip -1.0, 1/3 radius -0.8 and spine -0.5. No falls or fractures.\par \par Saw dermatology for hair loss. Hair loss is androgenic in origin. Hair loss has resolved and taking MVI.  [___] : [unfilled] [Hypoglycemia] : not hypoglycemic

## 2019-10-31 LAB
25(OH)D3 SERPL-MCNC: 42.3 NG/ML
ALBUMIN SERPL ELPH-MCNC: 5.1 G/DL
ALP BLD-CCNC: 67 U/L
ALT SERPL-CCNC: 29 U/L
ANION GAP SERPL CALC-SCNC: 11 MMOL/L
AST SERPL-CCNC: 18 U/L
BASOPHILS # BLD AUTO: 0.04 K/UL
BASOPHILS NFR BLD AUTO: 0.4 %
BILIRUB SERPL-MCNC: 0.4 MG/DL
BUN SERPL-MCNC: 17 MG/DL
CALCIUM SERPL-MCNC: 10.5 MG/DL
CALCIUM SERPL-MCNC: 10.5 MG/DL
CHLORIDE SERPL-SCNC: 99 MMOL/L
CHOLEST SERPL-MCNC: 152 MG/DL
CHOLEST/HDLC SERPL: 2.7 RATIO
CO2 SERPL-SCNC: 25 MMOL/L
CREAT SERPL-MCNC: 0.84 MG/DL
CREAT SPEC-SCNC: 41 MG/DL
EOSINOPHIL # BLD AUTO: 0.17 K/UL
EOSINOPHIL NFR BLD AUTO: 1.9 %
GLUCOSE SERPL-MCNC: 130 MG/DL
HCT VFR BLD CALC: 47.3 %
HDLC SERPL-MCNC: 56 MG/DL
HGB BLD-MCNC: 14.7 G/DL
IMM GRANULOCYTES NFR BLD AUTO: 0.2 %
LDLC SERPL CALC-MCNC: 71 MG/DL
LYMPHOCYTES # BLD AUTO: 2.19 K/UL
LYMPHOCYTES NFR BLD AUTO: 24.5 %
MAN DIFF?: NORMAL
MCHC RBC-ENTMCNC: 28.7 PG
MCHC RBC-ENTMCNC: 31.1 GM/DL
MCV RBC AUTO: 92.4 FL
MICROALBUMIN 24H UR DL<=1MG/L-MCNC: <1.2 MG/DL
MICROALBUMIN/CREAT 24H UR-RTO: NORMAL MG/G
MONOCYTES # BLD AUTO: 0.57 K/UL
MONOCYTES NFR BLD AUTO: 6.4 %
NEUTROPHILS # BLD AUTO: 5.96 K/UL
NEUTROPHILS NFR BLD AUTO: 66.6 %
PARATHYROID HORMONE INTACT: 60 PG/ML
PHOSPHATE SERPL-MCNC: 3.4 MG/DL
PLATELET # BLD AUTO: 256 K/UL
POTASSIUM SERPL-SCNC: 4.6 MMOL/L
PROT SERPL-MCNC: 7.4 G/DL
RBC # BLD: 5.12 M/UL
RBC # FLD: 13.7 %
SODIUM SERPL-SCNC: 135 MMOL/L
T4 FREE SERPL-MCNC: 1.4 NG/DL
TRIGL SERPL-MCNC: 123 MG/DL
TSH SERPL-ACNC: 0.88 UIU/ML
WBC # FLD AUTO: 8.95 K/UL

## 2019-12-16 ENCOUNTER — RX RENEWAL (OUTPATIENT)
Age: 59
End: 2019-12-16

## 2020-02-27 ENCOUNTER — RX RENEWAL (OUTPATIENT)
Age: 60
End: 2020-02-27

## 2020-03-10 ENCOUNTER — APPOINTMENT (OUTPATIENT)
Dept: ENDOCRINOLOGY | Facility: CLINIC | Age: 60
End: 2020-03-10
Payer: COMMERCIAL

## 2020-03-10 VITALS
BODY MASS INDEX: 34.65 KG/M2 | DIASTOLIC BLOOD PRESSURE: 78 MMHG | OXYGEN SATURATION: 99 % | SYSTOLIC BLOOD PRESSURE: 118 MMHG | HEART RATE: 77 BPM | HEIGHT: 63.25 IN | WEIGHT: 198 LBS

## 2020-03-10 LAB — HBA1C MFR BLD HPLC: 5.8

## 2020-03-10 PROCEDURE — 99214 OFFICE O/P EST MOD 30 MIN: CPT | Mod: 25

## 2020-03-10 PROCEDURE — 36415 COLL VENOUS BLD VENIPUNCTURE: CPT

## 2020-03-10 PROCEDURE — 83036 HEMOGLOBIN GLYCOSYLATED A1C: CPT | Mod: QW

## 2020-03-10 RX ORDER — PHENTERMINE HYDROCHLORIDE 30 MG/1
30 CAPSULE ORAL
Qty: 30 | Refills: 0 | Status: DISCONTINUED | COMMUNITY
Start: 2018-04-06 | End: 2020-03-10

## 2020-03-10 NOTE — ASSESSMENT
[FreeTextEntry1] : 59 y.o. female with h/o Type 2 DM, hyperlipidemia, hypothyroidism and thyroid nodules.\par 1. Type 2 DM- Excellent control with Hba1c of 5.8% today. Encouraged carbohydrate consistent diet and exercise. Given low carb diet, will discontinue Januvia 100 mg daily.  WIll monitor for now off medications. Will check CMP and urine microalb/cr ratio. \par 2. BP is at goal and normal urine microalb/cr ratio in 10/2019\par 3. Hyperlipidemia- Will check lipids and will continue statin.\par 4. Hypothyroidism- Will check TFTs and adjust LT4 accordingly\par 5. Thyroid nodules- Will monitor for now and repeat thyroid ultrasound now.\par 6. Vitamin D def/Primary hyperparathyroidism- Will check serum calcium, phosphorus, intact PTH and 25 vitamin D level. Will continue vitamin D3 2,000 Iu daily.\par 7. Obesity- Encouraged exercise and portion control. \par 8. Osteopenia- Average risk of fracture. Will monitor for now. Recommend repeat DEXA scan now. Encouraged weight bearing activity. Will check 25 vitamin D level. \par \par Follow up in 3-4 months\par Labs drawn in the office today [Carbohydrate Consistent Diet] : carbohydrate consistent diet [Long Term Vascular Complications] : long term vascular complications of diabetes [Importance of Diet and Exercise] : importance of diet and exercise to improve glycemic control, achieve weight loss and improve cardiovascular health

## 2020-03-10 NOTE — HISTORY OF PRESENT ILLNESS
[FreeTextEntry1] : 59 y.o. female with h/o Type 2 DM, hypothyroidism, hyperlipidemia here for follow up visit. No acute events since last visit.\par \par  No fatigue. Has been doing better with diet called [employer]. Weight is stable since last visit. Eating 5 shant snacks every 3 hours with one lean green meal. Taking Januvia 100 mg daily. Off Jardiance 25 mg daily because of yeast infection. Intolerant to Metformin and Trulicity in the past. Stopped Phentermine. Not using Freestyle Addison now. Monitoring FS on occasion and mornings FS are 130s. States she feels symptoms of hypoglycemia but rare. UTD with opthalmology (May 2019) and no retinopathy. No neuropathy. Follows with podiatry.  No proteinuria. Following with GI for fatty liver. No exercise. Knee pain is better now, saw orthopedics. Completed PT. Seeing nutritionist through [employer].\par \par Hyperlipidemia- taking Atorvastatin daily\par \par Regarding thyroid disease, no neck complaints. Taking LT4 88 mcg daily.  Last sonogram in January 2019 showed stable b/l subcentimeter thyroid nodules with largest in the left lower pole which is 1.2 cm. C/o raspy voice. \par \par Also h/o hypercalcemia most likely primary hyperparathyroidism. No h/o kidney stones. No polyuria and polydipsia.  Taking vitamin D 2,000 Iu daily. Had DEXA scan in March 2017 with GYN which showed spine -0.1 and femoral neck -0.7. Repeat DXA in March 2018 showed femoral neck -1.8, total hip -1.0, 1/3 radius -0.8 and spine -0.5. No falls or fractures.\par \par Saw dermatology for hair loss. Hair loss is androgenic in origin. Hair loss has resolved and taking MVI.  [___] : [unfilled] [Hypoglycemia] : not hypoglycemic

## 2020-03-10 NOTE — REVIEW OF SYSTEMS
[Fatigue] : no fatigue [Recent Weight Gain (___ Lbs)] : no recent weight gain [Recent Weight Loss (___ Lbs)] : no recent weight loss [Dysphagia] : no dysphagia [Shortness Of Breath] : no shortness of breath [Wheezing] : no wheezing was heard [Polyuria] : no polyuria [Joint Pain] : joint pain [Hair Loss] : no hair loss [Pain/Numbness of Digits] : no pain/numbness of digits [Stress] : stress [Heat Intolerance] : heat intolerant [Swelling] : no swelling [Negative] : Gastrointestinal [All other systems negative] : All other systems negative

## 2020-03-11 LAB
25(OH)D3 SERPL-MCNC: 53.9 NG/ML
ALBUMIN SERPL ELPH-MCNC: 5.2 G/DL
ALP BLD-CCNC: 61 U/L
ALT SERPL-CCNC: 31 U/L
ANION GAP SERPL CALC-SCNC: 14 MMOL/L
AST SERPL-CCNC: 17 U/L
BASOPHILS # BLD AUTO: 0.05 K/UL
BASOPHILS NFR BLD AUTO: 0.5 %
BILIRUB SERPL-MCNC: 0.3 MG/DL
BUN SERPL-MCNC: 33 MG/DL
CALCIUM SERPL-MCNC: 10.8 MG/DL
CALCIUM SERPL-MCNC: 10.8 MG/DL
CHLORIDE SERPL-SCNC: 101 MMOL/L
CHOLEST SERPL-MCNC: 172 MG/DL
CHOLEST/HDLC SERPL: 2.8 RATIO
CO2 SERPL-SCNC: 25 MMOL/L
CREAT SERPL-MCNC: 0.81 MG/DL
CREAT SPEC-SCNC: 66 MG/DL
EOSINOPHIL # BLD AUTO: 0.23 K/UL
EOSINOPHIL NFR BLD AUTO: 2.5 %
GLUCOSE SERPL-MCNC: 140 MG/DL
HCT VFR BLD CALC: 46.6 %
HDLC SERPL-MCNC: 61 MG/DL
HGB BLD-MCNC: 14.4 G/DL
IMM GRANULOCYTES NFR BLD AUTO: 0.1 %
LDLC SERPL CALC-MCNC: 84 MG/DL
LYMPHOCYTES # BLD AUTO: 2.53 K/UL
LYMPHOCYTES NFR BLD AUTO: 27.8 %
MAN DIFF?: NORMAL
MCHC RBC-ENTMCNC: 29.4 PG
MCHC RBC-ENTMCNC: 30.9 GM/DL
MCV RBC AUTO: 95.3 FL
MICROALBUMIN 24H UR DL<=1MG/L-MCNC: <1.2 MG/DL
MICROALBUMIN/CREAT 24H UR-RTO: NORMAL MG/G
MONOCYTES # BLD AUTO: 0.64 K/UL
MONOCYTES NFR BLD AUTO: 7 %
NEUTROPHILS # BLD AUTO: 5.65 K/UL
NEUTROPHILS NFR BLD AUTO: 62.1 %
PARATHYROID HORMONE INTACT: 50 PG/ML
PHOSPHATE SERPL-MCNC: 4.1 MG/DL
PLATELET # BLD AUTO: 243 K/UL
POTASSIUM SERPL-SCNC: 5.5 MMOL/L
PROT SERPL-MCNC: 7.4 G/DL
RBC # BLD: 4.89 M/UL
RBC # FLD: 14.3 %
SODIUM SERPL-SCNC: 140 MMOL/L
T4 FREE SERPL-MCNC: 1.5 NG/DL
TRIGL SERPL-MCNC: 134 MG/DL
TSH SERPL-ACNC: 0.98 UIU/ML
WBC # FLD AUTO: 9.11 K/UL

## 2020-04-02 ENCOUNTER — RX RENEWAL (OUTPATIENT)
Age: 60
End: 2020-04-02

## 2020-06-03 RX ORDER — ISOPROPYL ALCOHOL 0.7 ML/ML
SWAB TOPICAL
Qty: 4 | Refills: 3 | Status: ACTIVE | COMMUNITY
Start: 2020-06-03

## 2020-07-13 ENCOUNTER — APPOINTMENT (OUTPATIENT)
Dept: ENDOCRINOLOGY | Facility: CLINIC | Age: 60
End: 2020-07-13
Payer: COMMERCIAL

## 2020-07-13 VITALS
OXYGEN SATURATION: 97 % | HEIGHT: 63.25 IN | TEMPERATURE: 98.2 F | SYSTOLIC BLOOD PRESSURE: 117 MMHG | HEART RATE: 76 BPM | DIASTOLIC BLOOD PRESSURE: 79 MMHG | WEIGHT: 200 LBS | BODY MASS INDEX: 35 KG/M2

## 2020-07-13 LAB — HBA1C MFR BLD HPLC: 5.6

## 2020-07-13 PROCEDURE — 83036 HEMOGLOBIN GLYCOSYLATED A1C: CPT | Mod: QW

## 2020-07-13 PROCEDURE — 99214 OFFICE O/P EST MOD 30 MIN: CPT | Mod: 25

## 2020-07-13 PROCEDURE — 36415 COLL VENOUS BLD VENIPUNCTURE: CPT

## 2020-07-13 NOTE — PHYSICAL EXAM
[Alert] : alert [No Acute Distress] : no acute distress [Normal Sclera/Conjunctiva] : normal sclera/conjunctiva [EOMI] : extra ocular movement intact [No LAD] : no lymphadenopathy [Thyroid Not Enlarged] : the thyroid was not enlarged [No Respiratory Distress] : no respiratory distress [Clear to Auscultation] : lungs were clear to auscultation bilaterally [Normal S1, S2] : normal S1 and S2 [Regular Rhythm] : with a regular rhythm [No Edema] : no peripheral edema [Pedal Pulses Normal] : the pedal pulses are present [Normal Bowel Sounds] : normal bowel sounds [Not Tender] : non-tender [Not Distended] : not distended [Soft] : abdomen soft [Normal Anterior Cervical Nodes] : no anterior cervical lymphadenopathy [No Clubbing, Cyanosis] : no clubbing  or cyanosis of the fingernails [No Rash] : no rash [Right Foot Was Examined] : right foot ~C was examined [Left Foot Was Examined] : left foot ~C was examined [Normal] : normal [2+] : 2+ in the dorsalis pedis [Normal Reflexes] : deep tendon reflexes were 2+ and symmetric [Normal Affect] : the affect was normal [Normal Mood] : the mood was normal [Kyphosis] : no kyphosis present [FreeTextEntry5] : callus [FreeTextEntry1] : callus [Diminished Throughout Both Feet] : normal tactile sensation with monofilament testing throughout both feet

## 2020-07-13 NOTE — ASSESSMENT
[Carbohydrate Consistent Diet] : carbohydrate consistent diet [Long Term Vascular Complications] : long term vascular complications of diabetes [Importance of Diet and Exercise] : importance of diet and exercise to improve glycemic control, achieve weight loss and improve cardiovascular health [FreeTextEntry1] : 60 y.o. female with h/o Type 2 DM, hyperlipidemia, hypothyroidism and thyroid nodules.\par 1. Type 2 DM- Excellent control with Hba1c of 5.6% today. Encouraged carbohydrate consistent diet and exercise. Will continue Januvia 100 mg daily.  Will check CMP and urine microalb/cr ratio. \par 2. BP is at goal and normal urine microalb/cr ratio in March 2020\par 3. Hyperlipidemia- Will check lipids and will continue statin.\par 4. Hypothyroidism- Will check TFTs and adjust LT4 accordingly\par 5. Thyroid nodules- Will monitor for now and repeat thyroid ultrasound in 6 months\par 6. Vitamin D def/Primary hyperparathyroidism- Will check serum calcium, phosphorus, intact PTH and 25 vitamin D level. Will continue vitamin D3 1,000 Iu daily.\par 7. Obesity- Encouraged exercise and portion control. \par 8. Osteopenia- Average risk of fracture. Will monitor for now. Recommend repeat DEXA scan now. Encouraged weight bearing activity. Will check 25 vitamin D level. \par \par Follow up in 3-4 months\par Labs drawn in the office today

## 2020-07-13 NOTE — REASON FOR VISIT
[Follow - Up] : a follow-up visit [DM Type 2] : DM Type 2 [Thyroid nodule/ MNG] : thyroid nodule/ MNG [Hypothyroidism] : hypothyroidism

## 2020-07-13 NOTE — REVIEW OF SYSTEMS
[Recent Weight Gain (___ Lbs)] : recent weight gain: [unfilled] lbs [Dysphonia] : dysphonia [Joint Pain] : joint pain [Negative] : Endocrine [Dysphagia] : no dysphagia [Fatigue] : no fatigue [Polyuria] : no polyuria [Hair Loss] : no hair loss [Swelling] : no swelling [Pain/Numbness of Digits] : no pain/numbness of digits

## 2020-07-13 NOTE — HISTORY OF PRESENT ILLNESS
[___] : [unfilled] [Hypoglycemia] : not hypoglycemic [FreeTextEntry1] : 60 y.o. female with h/o Type 2 DM, hypothyroidism, hyperlipidemia here for follow up visit. No acute events since last visit.\par \par No fatigue. Had been doing better with diet called [employer]. Weight is up since last visit. Eating 8 shant snacks every 3 hours with one lean green meal. No sweets. Taking Januvia 100 mg daily since April 2020. Off Jardiance 25 mg daily because of yeast infection. Intolerant to Metformin and Trulicity in the past. Stopped Phentermine. Not using Freestyle Addison now. Monitoring FS on occasion and mornings FS are 132 to 161. No hypoglycemia. Past due with opthalmology (May 2019) and no retinopathy. No neuropathy. Follows with podiatry.  No proteinuria. Following with GI for fatty liver. No exercise. Knee pain is better now, saw orthopedics. Completed PT. Seeing nutritionist through [employer].\par \par Hyperlipidemia- taking Atorvastatin daily\par \par Regarding thyroid disease, no neck complaints. Taking LT4 88 mcg daily. Thyroid sonogram in January 2019 showed stable b/l subcentimeter thyroid nodules with largest in the left lower pole which is 1.2 cm. Thyroid sonogram on 7/8/2020 shows mild increase in size of left lower pole nodule to 1.6 cm. Left mid pole nodule is stable at 0.6 cm. There is a new right 0.7 cm hypoechoic nodule.  C/o raspy voice. \par \par Also h/o hypercalcemia most likely primary hyperparathyroidism. No h/o kidney stones. No polyuria and polydipsia.  Taking vitamin D 1,000 Iu daily. Had DEXA scan in March 2017 with GYN which showed spine -0.1 and femoral neck -0.7. Repeat DEXA in March 2018 showed femoral neck -1.8, total hip -1.0, 1/3 radius -0.8 and spine -0.5. No falls or fractures.\par \par Saw dermatology for hair loss. Hair loss is androgenic in origin. Hair loss has resolved and taking MVI.

## 2020-07-15 LAB
25(OH)D3 SERPL-MCNC: 52.8 NG/ML
ALBUMIN SERPL ELPH-MCNC: 5.2 G/DL
ALP BLD-CCNC: 65 U/L
ALT SERPL-CCNC: 27 U/L
ANION GAP SERPL CALC-SCNC: 15 MMOL/L
AST SERPL-CCNC: 19 U/L
BASOPHILS # BLD AUTO: 0.04 K/UL
BASOPHILS NFR BLD AUTO: 0.5 %
BILIRUB SERPL-MCNC: 0.4 MG/DL
BUN SERPL-MCNC: 27 MG/DL
CALCIUM SERPL-MCNC: 10.6 MG/DL
CALCIUM SERPL-MCNC: 10.6 MG/DL
CHLORIDE SERPL-SCNC: 100 MMOL/L
CHOLEST SERPL-MCNC: 160 MG/DL
CHOLEST/HDLC SERPL: 2.9 RATIO
CO2 SERPL-SCNC: 23 MMOL/L
CREAT SERPL-MCNC: 0.73 MG/DL
EOSINOPHIL # BLD AUTO: 0.15 K/UL
EOSINOPHIL NFR BLD AUTO: 1.9 %
GLUCOSE SERPL-MCNC: 103 MG/DL
HCT VFR BLD CALC: 46.7 %
HDLC SERPL-MCNC: 55 MG/DL
HGB BLD-MCNC: 14.7 G/DL
IMM GRANULOCYTES NFR BLD AUTO: 0.3 %
LDLC SERPL CALC-MCNC: 76 MG/DL
LDLC SERPL DIRECT ASSAY-MCNC: 78 MG/DL
LYMPHOCYTES # BLD AUTO: 2.54 K/UL
LYMPHOCYTES NFR BLD AUTO: 32 %
MAN DIFF?: NORMAL
MCHC RBC-ENTMCNC: 29.8 PG
MCHC RBC-ENTMCNC: 31.5 GM/DL
MCV RBC AUTO: 94.5 FL
MONOCYTES # BLD AUTO: 0.56 K/UL
MONOCYTES NFR BLD AUTO: 7.1 %
NEUTROPHILS # BLD AUTO: 4.62 K/UL
NEUTROPHILS NFR BLD AUTO: 58.2 %
PARATHYROID HORMONE INTACT: 53 PG/ML
PHOSPHATE SERPL-MCNC: 3.4 MG/DL
PLATELET # BLD AUTO: 218 K/UL
POTASSIUM SERPL-SCNC: 4.7 MMOL/L
PROT SERPL-MCNC: 7.3 G/DL
RBC # BLD: 4.94 M/UL
RBC # FLD: 13.9 %
SODIUM SERPL-SCNC: 138 MMOL/L
T4 FREE SERPL-MCNC: 1.4 NG/DL
TRIGL SERPL-MCNC: 144 MG/DL
TSH SERPL-ACNC: 0.45 UIU/ML
VIT B12 SERPL-MCNC: 1360 PG/ML
WBC # FLD AUTO: 7.93 K/UL

## 2020-09-02 ENCOUNTER — APPOINTMENT (OUTPATIENT)
Dept: ENDOCRINOLOGY | Facility: CLINIC | Age: 60
End: 2020-09-02
Payer: COMMERCIAL

## 2020-09-02 VITALS — WEIGHT: 206 LBS | BODY MASS INDEX: 36.5 KG/M2 | TEMPERATURE: 97.4 F | HEIGHT: 63 IN

## 2020-09-02 PROCEDURE — 77080 DXA BONE DENSITY AXIAL: CPT

## 2020-11-30 ENCOUNTER — RX RENEWAL (OUTPATIENT)
Age: 60
End: 2020-11-30

## 2020-12-25 ENCOUNTER — TRANSCRIPTION ENCOUNTER (OUTPATIENT)
Age: 60
End: 2020-12-25

## 2020-12-26 ENCOUNTER — RESULT REVIEW (OUTPATIENT)
Age: 60
End: 2020-12-26

## 2020-12-26 ENCOUNTER — INPATIENT (INPATIENT)
Facility: HOSPITAL | Age: 60
LOS: 0 days | Discharge: HOME | DRG: 343 | End: 2020-12-27
Attending: SURGERY | Admitting: SURGERY
Payer: COMMERCIAL

## 2020-12-26 VITALS
SYSTOLIC BLOOD PRESSURE: 151 MMHG | HEART RATE: 102 BPM | OXYGEN SATURATION: 97 % | HEIGHT: 63 IN | DIASTOLIC BLOOD PRESSURE: 83 MMHG | WEIGHT: 205.03 LBS | TEMPERATURE: 99 F | RESPIRATION RATE: 18 BRPM

## 2020-12-26 DIAGNOSIS — Z98.891 HISTORY OF UTERINE SCAR FROM PREVIOUS SURGERY: Chronic | ICD-10-CM

## 2020-12-26 DIAGNOSIS — K35.80 UNSPECIFIED ACUTE APPENDICITIS: ICD-10-CM

## 2020-12-26 DIAGNOSIS — Z98.890 OTHER SPECIFIED POSTPROCEDURAL STATES: Chronic | ICD-10-CM

## 2020-12-26 LAB
ALBUMIN SERPL ELPH-MCNC: 4.6 G/DL — SIGNIFICANT CHANGE UP (ref 3.3–5)
ALP SERPL-CCNC: 76 U/L — SIGNIFICANT CHANGE UP (ref 40–120)
ALT FLD-CCNC: 18 U/L — SIGNIFICANT CHANGE UP (ref 10–45)
ANION GAP SERPL CALC-SCNC: 15 MMOL/L — SIGNIFICANT CHANGE UP (ref 5–17)
APPEARANCE UR: CLEAR — SIGNIFICANT CHANGE UP
APTT BLD: 36.5 SEC — HIGH (ref 27.5–35.5)
AST SERPL-CCNC: 13 U/L — SIGNIFICANT CHANGE UP (ref 10–40)
BACTERIA # UR AUTO: NEGATIVE — SIGNIFICANT CHANGE UP
BASE EXCESS BLDV CALC-SCNC: 1.2 MMOL/L — SIGNIFICANT CHANGE UP (ref -2–2)
BASOPHILS # BLD AUTO: 0.02 K/UL — SIGNIFICANT CHANGE UP (ref 0–0.2)
BASOPHILS NFR BLD AUTO: 0.2 % — SIGNIFICANT CHANGE UP (ref 0–2)
BILIRUB SERPL-MCNC: 0.5 MG/DL — SIGNIFICANT CHANGE UP (ref 0.2–1.2)
BILIRUB UR-MCNC: NEGATIVE — SIGNIFICANT CHANGE UP
BLD GP AB SCN SERPL QL: NEGATIVE — SIGNIFICANT CHANGE UP
BUN SERPL-MCNC: 18 MG/DL — SIGNIFICANT CHANGE UP (ref 7–23)
CA-I SERPL-SCNC: 1.28 MMOL/L — SIGNIFICANT CHANGE UP (ref 1.12–1.3)
CALCIUM SERPL-MCNC: 10.3 MG/DL — SIGNIFICANT CHANGE UP (ref 8.4–10.5)
CHLORIDE BLDV-SCNC: 103 MMOL/L — SIGNIFICANT CHANGE UP (ref 96–108)
CHLORIDE SERPL-SCNC: 100 MMOL/L — SIGNIFICANT CHANGE UP (ref 96–108)
CO2 BLDV-SCNC: 27 MMOL/L — SIGNIFICANT CHANGE UP (ref 22–30)
CO2 SERPL-SCNC: 22 MMOL/L — SIGNIFICANT CHANGE UP (ref 22–31)
COLOR SPEC: COLORLESS — SIGNIFICANT CHANGE UP
CREAT SERPL-MCNC: 0.73 MG/DL — SIGNIFICANT CHANGE UP (ref 0.5–1.3)
DIFF PNL FLD: NEGATIVE — SIGNIFICANT CHANGE UP
EOSINOPHIL # BLD AUTO: 0.03 K/UL — SIGNIFICANT CHANGE UP (ref 0–0.5)
EOSINOPHIL NFR BLD AUTO: 0.3 % — SIGNIFICANT CHANGE UP (ref 0–6)
EPI CELLS # UR: 1 /HPF — SIGNIFICANT CHANGE UP
GAS PNL BLDV: 137 MMOL/L — SIGNIFICANT CHANGE UP (ref 135–145)
GAS PNL BLDV: SIGNIFICANT CHANGE UP
GAS PNL BLDV: SIGNIFICANT CHANGE UP
GLUCOSE BLDC GLUCOMTR-MCNC: 129 MG/DL — HIGH (ref 70–99)
GLUCOSE BLDC GLUCOMTR-MCNC: 135 MG/DL — HIGH (ref 70–99)
GLUCOSE BLDV-MCNC: 173 MG/DL — HIGH (ref 70–99)
GLUCOSE SERPL-MCNC: 170 MG/DL — HIGH (ref 70–99)
GLUCOSE UR QL: NEGATIVE — SIGNIFICANT CHANGE UP
HCO3 BLDV-SCNC: 26 MMOL/L — SIGNIFICANT CHANGE UP (ref 21–29)
HCT VFR BLD CALC: 43.8 % — SIGNIFICANT CHANGE UP (ref 34.5–45)
HCT VFR BLDA CALC: 46 % — SIGNIFICANT CHANGE UP (ref 39–50)
HGB BLD CALC-MCNC: 15.1 G/DL — SIGNIFICANT CHANGE UP (ref 11.5–15.5)
HGB BLD-MCNC: 14.5 G/DL — SIGNIFICANT CHANGE UP (ref 11.5–15.5)
HOROWITZ INDEX BLDV+IHG-RTO: SIGNIFICANT CHANGE UP
HYALINE CASTS # UR AUTO: 0 /LPF — SIGNIFICANT CHANGE UP (ref 0–2)
IMM GRANULOCYTES NFR BLD AUTO: 0.2 % — SIGNIFICANT CHANGE UP (ref 0–1.5)
INR BLD: 1.11 RATIO — SIGNIFICANT CHANGE UP (ref 0.88–1.16)
KETONES UR-MCNC: NEGATIVE — SIGNIFICANT CHANGE UP
LACTATE BLDV-MCNC: 1.2 MMOL/L — SIGNIFICANT CHANGE UP (ref 0.7–2)
LEUKOCYTE ESTERASE UR-ACNC: NEGATIVE — SIGNIFICANT CHANGE UP
LIDOCAIN IGE QN: 19 U/L — SIGNIFICANT CHANGE UP (ref 7–60)
LYMPHOCYTES # BLD AUTO: 1.26 K/UL — SIGNIFICANT CHANGE UP (ref 1–3.3)
LYMPHOCYTES # BLD AUTO: 13.1 % — SIGNIFICANT CHANGE UP (ref 13–44)
MCHC RBC-ENTMCNC: 29.4 PG — SIGNIFICANT CHANGE UP (ref 27–34)
MCHC RBC-ENTMCNC: 33.1 GM/DL — SIGNIFICANT CHANGE UP (ref 32–36)
MCV RBC AUTO: 88.7 FL — SIGNIFICANT CHANGE UP (ref 80–100)
MONOCYTES # BLD AUTO: 0.5 K/UL — SIGNIFICANT CHANGE UP (ref 0–0.9)
MONOCYTES NFR BLD AUTO: 5.2 % — SIGNIFICANT CHANGE UP (ref 2–14)
NEUTROPHILS # BLD AUTO: 7.81 K/UL — HIGH (ref 1.8–7.4)
NEUTROPHILS NFR BLD AUTO: 81 % — HIGH (ref 43–77)
NITRITE UR-MCNC: NEGATIVE — SIGNIFICANT CHANGE UP
NRBC # BLD: 0 /100 WBCS — SIGNIFICANT CHANGE UP (ref 0–0)
PCO2 BLDV: 42 MMHG — SIGNIFICANT CHANGE UP (ref 35–50)
PH BLDV: 7.4 — SIGNIFICANT CHANGE UP (ref 7.35–7.45)
PH UR: 6.5 — SIGNIFICANT CHANGE UP (ref 5–8)
PLATELET # BLD AUTO: 196 K/UL — SIGNIFICANT CHANGE UP (ref 150–400)
PO2 BLDV: 39 MMHG — SIGNIFICANT CHANGE UP (ref 25–45)
POTASSIUM BLDV-SCNC: 4 MMOL/L — SIGNIFICANT CHANGE UP (ref 3.5–5.3)
POTASSIUM SERPL-MCNC: 4.1 MMOL/L — SIGNIFICANT CHANGE UP (ref 3.5–5.3)
POTASSIUM SERPL-SCNC: 4.1 MMOL/L — SIGNIFICANT CHANGE UP (ref 3.5–5.3)
PROT SERPL-MCNC: 7.7 G/DL — SIGNIFICANT CHANGE UP (ref 6–8.3)
PROT UR-MCNC: NEGATIVE — SIGNIFICANT CHANGE UP
PROTHROM AB SERPL-ACNC: 13.3 SEC — SIGNIFICANT CHANGE UP (ref 10.6–13.6)
RBC # BLD: 4.94 M/UL — SIGNIFICANT CHANGE UP (ref 3.8–5.2)
RBC # FLD: 12.8 % — SIGNIFICANT CHANGE UP (ref 10.3–14.5)
RBC CASTS # UR COMP ASSIST: 8 /HPF — HIGH (ref 0–4)
RH IG SCN BLD-IMP: POSITIVE — SIGNIFICANT CHANGE UP
RH IG SCN BLD-IMP: POSITIVE — SIGNIFICANT CHANGE UP
SAO2 % BLDV: 72 % — SIGNIFICANT CHANGE UP (ref 67–88)
SARS-COV-2 RNA SPEC QL NAA+PROBE: SIGNIFICANT CHANGE UP
SODIUM SERPL-SCNC: 137 MMOL/L — SIGNIFICANT CHANGE UP (ref 135–145)
SP GR SPEC: 1 — LOW (ref 1.01–1.02)
TROPONIN T, HIGH SENSITIVITY RESULT: 8 NG/L — SIGNIFICANT CHANGE UP (ref 0–51)
UROBILINOGEN FLD QL: NEGATIVE — SIGNIFICANT CHANGE UP
WBC # BLD: 9.64 K/UL — SIGNIFICANT CHANGE UP (ref 3.8–10.5)
WBC # FLD AUTO: 9.64 K/UL — SIGNIFICANT CHANGE UP (ref 3.8–10.5)
WBC UR QL: 6 /HPF — HIGH (ref 0–5)

## 2020-12-26 PROCEDURE — 88304 TISSUE EXAM BY PATHOLOGIST: CPT | Mod: 26

## 2020-12-26 PROCEDURE — 71046 X-RAY EXAM CHEST 2 VIEWS: CPT | Mod: 26

## 2020-12-26 PROCEDURE — 76705 ECHO EXAM OF ABDOMEN: CPT | Mod: 26

## 2020-12-26 PROCEDURE — 74177 CT ABD & PELVIS W/CONTRAST: CPT | Mod: 26

## 2020-12-26 PROCEDURE — 93010 ELECTROCARDIOGRAM REPORT: CPT

## 2020-12-26 PROCEDURE — 99284 EMERGENCY DEPT VISIT MOD MDM: CPT

## 2020-12-26 PROCEDURE — 44970 LAPAROSCOPY APPENDECTOMY: CPT

## 2020-12-26 RX ORDER — IBUPROFEN 200 MG
400 TABLET ORAL EVERY 6 HOURS
Refills: 0 | Status: DISCONTINUED | OUTPATIENT
Start: 2020-12-26 | End: 2020-12-27

## 2020-12-26 RX ORDER — SODIUM CHLORIDE 9 MG/ML
1000 INJECTION, SOLUTION INTRAVENOUS
Refills: 0 | Status: DISCONTINUED | OUTPATIENT
Start: 2020-12-26 | End: 2020-12-26

## 2020-12-26 RX ORDER — CEFOTETAN DISODIUM 1 G
1 VIAL (EA) INJECTION ONCE
Refills: 0 | Status: DISCONTINUED | OUTPATIENT
Start: 2020-12-26 | End: 2020-12-26

## 2020-12-26 RX ORDER — SODIUM CHLORIDE 9 MG/ML
1000 INJECTION INTRAMUSCULAR; INTRAVENOUS; SUBCUTANEOUS ONCE
Refills: 0 | Status: COMPLETED | OUTPATIENT
Start: 2020-12-26 | End: 2020-12-26

## 2020-12-26 RX ORDER — ONDANSETRON 8 MG/1
4 TABLET, FILM COATED ORAL ONCE
Refills: 0 | Status: COMPLETED | OUTPATIENT
Start: 2020-12-26 | End: 2020-12-26

## 2020-12-26 RX ORDER — SODIUM CHLORIDE 9 MG/ML
1000 INJECTION, SOLUTION INTRAVENOUS
Refills: 0 | Status: DISCONTINUED | OUTPATIENT
Start: 2020-12-26 | End: 2020-12-27

## 2020-12-26 RX ORDER — SITAGLIPTIN 50 MG/1
1 TABLET, FILM COATED ORAL
Qty: 0 | Refills: 0 | DISCHARGE

## 2020-12-26 RX ORDER — FAMOTIDINE 10 MG/ML
20 INJECTION INTRAVENOUS ONCE
Refills: 0 | Status: COMPLETED | OUTPATIENT
Start: 2020-12-26 | End: 2020-12-26

## 2020-12-26 RX ORDER — ENOXAPARIN SODIUM 100 MG/ML
40 INJECTION SUBCUTANEOUS DAILY
Refills: 0 | Status: DISCONTINUED | OUTPATIENT
Start: 2020-12-27 | End: 2020-12-27

## 2020-12-26 RX ORDER — HYDROMORPHONE HYDROCHLORIDE 2 MG/ML
0.5 INJECTION INTRAMUSCULAR; INTRAVENOUS; SUBCUTANEOUS
Refills: 0 | Status: DISCONTINUED | OUTPATIENT
Start: 2020-12-26 | End: 2020-12-27

## 2020-12-26 RX ORDER — OXYCODONE HYDROCHLORIDE 5 MG/1
5 TABLET ORAL ONCE
Refills: 0 | Status: DISCONTINUED | OUTPATIENT
Start: 2020-12-26 | End: 2020-12-26

## 2020-12-26 RX ORDER — ONDANSETRON 8 MG/1
4 TABLET, FILM COATED ORAL EVERY 6 HOURS
Refills: 0 | Status: DISCONTINUED | OUTPATIENT
Start: 2020-12-26 | End: 2020-12-26

## 2020-12-26 RX ORDER — CEFOTETAN DISODIUM 1 G
VIAL (EA) INJECTION
Refills: 0 | Status: DISCONTINUED | OUTPATIENT
Start: 2020-12-26 | End: 2020-12-26

## 2020-12-26 RX ORDER — CEFOTETAN DISODIUM 1 G
1 VIAL (EA) INJECTION ONCE
Refills: 0 | Status: COMPLETED | OUTPATIENT
Start: 2020-12-26 | End: 2020-12-26

## 2020-12-26 RX ORDER — CITALOPRAM 10 MG/1
1 TABLET, FILM COATED ORAL
Qty: 0 | Refills: 0 | DISCHARGE

## 2020-12-26 RX ORDER — ONDANSETRON 8 MG/1
4 TABLET, FILM COATED ORAL ONCE
Refills: 0 | Status: DISCONTINUED | OUTPATIENT
Start: 2020-12-26 | End: 2020-12-27

## 2020-12-26 RX ORDER — ATORVASTATIN CALCIUM 80 MG/1
1 TABLET, FILM COATED ORAL
Qty: 0 | Refills: 0 | DISCHARGE

## 2020-12-26 RX ORDER — LEVOTHYROXINE SODIUM 125 MCG
1 TABLET ORAL
Qty: 0 | Refills: 0 | DISCHARGE

## 2020-12-26 RX ORDER — ACETAMINOPHEN 500 MG
1000 TABLET ORAL ONCE
Refills: 0 | Status: DISCONTINUED | OUTPATIENT
Start: 2020-12-26 | End: 2020-12-26

## 2020-12-26 RX ORDER — ACETAMINOPHEN 500 MG
975 TABLET ORAL EVERY 6 HOURS
Refills: 0 | Status: DISCONTINUED | OUTPATIENT
Start: 2020-12-26 | End: 2020-12-27

## 2020-12-26 RX ORDER — OXYCODONE HYDROCHLORIDE 5 MG/1
5 TABLET ORAL EVERY 4 HOURS
Refills: 0 | Status: DISCONTINUED | OUTPATIENT
Start: 2020-12-26 | End: 2020-12-27

## 2020-12-26 RX ADMIN — SODIUM CHLORIDE 1000 MILLILITER(S): 9 INJECTION INTRAMUSCULAR; INTRAVENOUS; SUBCUTANEOUS at 12:25

## 2020-12-26 RX ADMIN — ONDANSETRON 4 MILLIGRAM(S): 8 TABLET, FILM COATED ORAL at 12:24

## 2020-12-26 RX ADMIN — FAMOTIDINE 20 MILLIGRAM(S): 10 INJECTION INTRAVENOUS at 12:24

## 2020-12-26 RX ADMIN — Medication 30 MILLILITER(S): at 12:34

## 2020-12-26 RX ADMIN — Medication 100 GRAM(S): at 17:06

## 2020-12-26 RX ADMIN — SODIUM CHLORIDE 75 MILLILITER(S): 9 INJECTION, SOLUTION INTRAVENOUS at 23:58

## 2020-12-26 NOTE — H&P ADULT - NSHPPHYSICALEXAM_GEN_ALL_CORE
General: Well-developed, in no acute distress   Neurologic: Awake, alert, GCS 15, No focal Deficits   Respiratory: Normal respiratory effort  CVS: RRR, perfusing adequately  Abdomen: Abdomen obese, soft, ND, mild RLQ TTP, no rebound or guarding   Ext: Grossly symmetric, Moving all extremities  Skin: Warm, dry, intact, no erythema

## 2020-12-26 NOTE — ED PROVIDER NOTE - ATTENDING CONTRIBUTION TO CARE
I have personally performed a face to face medical and diagnostic evaluation of the patient. I have discussed with and reviewed the ACP's note and agree with the History, ROS, Physical Exam and MDM unless otherwise indicated. A brief summary of my personal evaluation and impression can be found below.    60F presents w a cc of x4 days upper abdominal / epigastric pain described as "uncomfortable" reduced PO intake, persisting prompting ED visit, non radiating feels as if may radiate diffusely subjective fevers/chills, never had episode like this before, no exertional sx. Denies n/v//cp/sob. Denies headache, syncope, lightheadedness, dizziness. Denies chest palpitations. Denies edema. Denies dysuria, hematuria, BRBPR, tarry stools, diarrhea, constipation.     All other ROS negative, except as above and as per HPI and ROS section.    VITALS: Initial triage and subsequent vitals have been reviewed by me.  GEN APPEARANCE: WDWN, alert, non-toxic, NAD  HEAD: Atraumatic.  EYES: PERRLa, EOMI, vision grossly intact.   NECK: Supple  CV: RRR, S1S2, no c/r/m/g. Cap refill <2 seconds. No bruits.   LUNGS: CTAB. No abnormal breath sounds.  ABDOMEN: Soft, NTND. No guarding or rebound.   MSK/EXT: minimal epigastric / rlq ttp no rebound no guarding soft abdomen   NEURO: Alert, follows commands. Weight bearing normal. Speech normal. Sensation and motor normal x4 extremities.   SKIN: Warm, dry and intact. No rash.  PSYCH: Appropriate    Plan/MDM: 60F DM presents w four days intermittent mild but persisting abdominal pain a/w subjective fevers/chills. Exam vss non toxic well appearing minimal epigastric/rlq ttp, ddx c/f christopher v panc vs less likely acs also consider colitis gerd less likely appendicitis? will check labs cxr cardiacs start with ruq sono, consider ct if non diagnostic and not improved after gi cocktail, reassess.

## 2020-12-26 NOTE — H&P ADULT - NSHPLABSRESULTS_GEN_ALL_CORE
< from: CT Abdomen and Pelvis w/ IV Cont (12.26.20 @ 15:27) >      EXAM:  CT ABDOMEN AND PELVIS IC                            PROCEDURE DATE:  12/26/2020            INTERPRETATION:  CLINICAL INFORMATION: Right upper abdominal pain.    COMPARISON: Correlation is made with right upper quadrant ultrasound performed on 12/26/2020 at 1:19 PM.    PROCEDURE:  CT of the Abdomen and Pelvis was performed with intravenous contrast.  Intravenous contrast: 90 ml Omnipaque 350. 10 ml discarded.  Oral contrast: None.  Sagittal and coronal reformats were performed.    FINDINGS:  LOWER CHEST: Indeterminate 0.5 cm right lower lobe nodule (2:9).    LIVER: Within normal limits.  BILE DUCTS: Normal caliber.  GALLBLADDER: The polyp noted on ultrasound is not well seen on CT. There may be a very small calcification in the wall of the gallbladder.  SPLEEN: Within normal limits.  PANCREAS: Within normal limits.  ADRENALS: Within normal limits.  KIDNEYS/URETERS: No hydronephrosis. A subcentimeter hypodense focus in the lower pole of the left kidney is too small to characterize,but which may represent a small angiomyolipoma.    BLADDER: Within normal limits.  REPRODUCTIVE ORGANS: Uterus and adnexa are within normal limits.    BOWEL: No bowel obstruction. Appendix is distended, measuring up to 1.3 cm with an enhancing wall and at least two appendicoliths in the proximal appendix. There are associated inflammatory changes as well as small right lower quadrant mesenteric lymph nodes.  PERITONEUM: No ascites.  VESSELS: Mild atherosclerotic calcification.  RETROPERITONEUM/LYMPH NODES: No lymphadenopathy.  ABDOMINAL WALL: Within normal limits.  BONES: Degenerative changes in the spine. Grade 1 anterolisthesis of L4 with respect to L5.    IMPRESSION:    Acute appendicitis with at least two appendicoliths.    Indeterminate 0.5 cm right lower lobe lung nodule. Dedicated chest CT is recommended for further evaluation.    Findings were discussed with Dr. YVES TEIXEIRA on  12/26/2020 at 3:47 PM by Dr. Bartholomew with read back confirmation.                KALEIGH BARTHOLOMEW MD; Attending Radiologist  This document has been electronically signed. Dec 26 2020  3:51PM    < end of copied text >

## 2020-12-26 NOTE — ED PROVIDER NOTE - OBJECTIVE STATEMENT
DM, Hypothyroidism, HLD, 4days 59yo feDM, Hypothyroidism, HLD, 4days 61yo female pt with PMHx of DM, Hypothyroidism, HLD, presents to ED with Epigastric pain, N/V and right sided abdominal pain for 4days. Pt stated she's had intermittent epigastric pain since 4days ago with right sided abdomen and subjective fever. Denies radiating pain to back. Denies chills, sore throat, or congestion. Denies SOB/ABD pain or diarrhea/constipation. Last BM was this morning with normal stool. Denies urinary problems. Denies sensory changes or weakness to extremities.

## 2020-12-26 NOTE — H&P ADULT - HISTORY OF PRESENT ILLNESS
Patient is a 60y old Female who presents with a chief complaint of abdominal pain    HPI:   Audrey is a very pleasant 60 year old lady with history of DM type II, obesity, HLD, anxiety, and hypothyroidism presenting with abdominal pain. Pt states she first noticed vague epigastric pain 4 days ago, which resolved the next day. Pain recurred and became more severe as well as more socorro-umbilical in location yesterday. As pain had not resolved, pt presented to ED today. States pain is currently 4/10 in severity. Also endorses nausea but denies any emesis. Reports no changes in bowel function, last BM this morning reportedly normal. Last PO intake was at 10AM this morning. Denies any other complaints including recent illness/sick contacts, fevers/chills, chest pain/shortness of breath, vomiting, diarrhea/constipation.     ROS: 10-system review is otherwise negative except HPI above.      PAST MEDICAL & SURGICAL HISTORY:    FAMILY HISTORY:     [] Family history not pertinent as reviewed with the patient and family    SOCIAL HISTORY:   Nonsmoker  Nondrinker  Denies Illicit drug use     ALLERGIES: amoxicillin (Hives)  penicillin (Hives)      HOME MEDICATIONS:   CeleXA 40 mg oral tablet: 1 tab(s) orally once a day (26 Dec 2020 16:50)  Januvia 100 mg oral tablet: 1 tab(s) orally once a day (26 Dec 2020 16:50)  Lipitor 20 mg oral tablet: 1 tab(s) orally once a day (26 Dec 2020 16:50)  Synthroid 88 mcg (0.088 mg) oral tablet: 1 tab(s) orally once a day (26 Dec 2020 16:50)      --------------------------------------------------------------------------------------------

## 2020-12-26 NOTE — ED PROVIDER NOTE - PHYSICAL EXAMINATION
NAD, Tachycardia, Afebrile, Lungs clear. Epigastric/ RLQ tender. No CVA tender. Neuro- intact. No peripheral edema.

## 2020-12-26 NOTE — PRE-ANESTHESIA EVALUATION ADULT - NSANTHOSAYNRD_GEN_A_CORE
No. VELIA screening performed.  STOP BANG Legend: 0-2 = LOW Risk; 3-4 = INTERMEDIATE Risk; 5-8 = HIGH Risk

## 2020-12-26 NOTE — H&P ADULT - NSICDXPASTMEDICALHX_GEN_ALL_CORE_FT
PAST MEDICAL HISTORY:  Anxiety     HLD (hyperlipidemia)     Hypothyroidism     Obesity     Type 2 diabetes mellitus

## 2020-12-26 NOTE — ED ADULT NURSE NOTE - OBJECTIVE STATEMENT
61 yo female from home A&OX4 c/o epigastric pain x 4 days. PT endorses nausea, no vomiting, denies diarrhea. Last BM this morning, st was normal. Abd soft, mildly tender on palpation, non distended. Pt denies chx pn, LOC, dizziness. VS stable. IV access obtained in LAC via 18G catheter, labs drawn and sent. Pt medicated (see MAR). US and lab results pending.

## 2020-12-26 NOTE — H&P ADULT - ASSESSMENT
ASSESSMENT:  Audrey is a very pleasant 60 year old lady with history of DM type II, obesity, HLD, anxiety, and hypothyroidism presenting with acute uncomplicated appendicitis    PLAN:  - Admit to surgery, Dr. Yates  - NPO, IVF  - IV Abx: Cefotetan  - Pain control  - Added on to OR for laparoscopic appendectomy   - Case discussed with surgical attending       Cal Bolton, PGY 3  Surgery [phone]

## 2020-12-26 NOTE — ED ADULT NURSE NOTE - NSIMPLEMENTINTERV_GEN_ALL_ED
Implemented All Universal Safety Interventions:  [place] to call system. Call bell, personal items and telephone within reach. Instruct patient to call for assistance. Room bathroom lighting operational. Non-slip footwear when patient is off stretcher. Physically safe environment: no spills, clutter or unnecessary equipment. Stretcher in lowest position, wheels locked, appropriate side rails in place.

## 2020-12-27 ENCOUNTER — TRANSCRIPTION ENCOUNTER (OUTPATIENT)
Age: 60
End: 2020-12-27

## 2020-12-27 VITALS
DIASTOLIC BLOOD PRESSURE: 71 MMHG | HEART RATE: 76 BPM | OXYGEN SATURATION: 96 % | SYSTOLIC BLOOD PRESSURE: 131 MMHG | TEMPERATURE: 98 F | RESPIRATION RATE: 18 BRPM

## 2020-12-27 LAB — GLUCOSE BLDC GLUCOMTR-MCNC: 144 MG/DL — HIGH (ref 70–99)

## 2020-12-27 PROCEDURE — 96374 THER/PROPH/DIAG INJ IV PUSH: CPT

## 2020-12-27 PROCEDURE — 85018 HEMOGLOBIN: CPT

## 2020-12-27 PROCEDURE — 74177 CT ABD & PELVIS W/CONTRAST: CPT

## 2020-12-27 PROCEDURE — 96375 TX/PRO/DX INJ NEW DRUG ADDON: CPT

## 2020-12-27 PROCEDURE — 88304 TISSUE EXAM BY PATHOLOGIST: CPT

## 2020-12-27 PROCEDURE — 82962 GLUCOSE BLOOD TEST: CPT

## 2020-12-27 PROCEDURE — 86850 RBC ANTIBODY SCREEN: CPT

## 2020-12-27 PROCEDURE — 85610 PROTHROMBIN TIME: CPT

## 2020-12-27 PROCEDURE — 71046 X-RAY EXAM CHEST 2 VIEWS: CPT

## 2020-12-27 PROCEDURE — 82330 ASSAY OF CALCIUM: CPT

## 2020-12-27 PROCEDURE — 76705 ECHO EXAM OF ABDOMEN: CPT

## 2020-12-27 PROCEDURE — 86900 BLOOD TYPING SEROLOGIC ABO: CPT

## 2020-12-27 PROCEDURE — 84132 ASSAY OF SERUM POTASSIUM: CPT

## 2020-12-27 PROCEDURE — 84484 ASSAY OF TROPONIN QUANT: CPT

## 2020-12-27 PROCEDURE — 83690 ASSAY OF LIPASE: CPT

## 2020-12-27 PROCEDURE — 86901 BLOOD TYPING SEROLOGIC RH(D): CPT

## 2020-12-27 PROCEDURE — 99285 EMERGENCY DEPT VISIT HI MDM: CPT | Mod: 25

## 2020-12-27 PROCEDURE — 82435 ASSAY OF BLOOD CHLORIDE: CPT

## 2020-12-27 PROCEDURE — 93005 ELECTROCARDIOGRAM TRACING: CPT

## 2020-12-27 PROCEDURE — 80053 COMPREHEN METABOLIC PANEL: CPT

## 2020-12-27 PROCEDURE — 84295 ASSAY OF SERUM SODIUM: CPT

## 2020-12-27 PROCEDURE — C9399: CPT

## 2020-12-27 PROCEDURE — 82803 BLOOD GASES ANY COMBINATION: CPT

## 2020-12-27 PROCEDURE — 85014 HEMATOCRIT: CPT

## 2020-12-27 PROCEDURE — 87635 SARS-COV-2 COVID-19 AMP PRB: CPT

## 2020-12-27 PROCEDURE — 85025 COMPLETE CBC W/AUTO DIFF WBC: CPT

## 2020-12-27 PROCEDURE — C1889: CPT

## 2020-12-27 PROCEDURE — 81001 URINALYSIS AUTO W/SCOPE: CPT

## 2020-12-27 PROCEDURE — 83605 ASSAY OF LACTIC ACID: CPT

## 2020-12-27 PROCEDURE — 85730 THROMBOPLASTIN TIME PARTIAL: CPT

## 2020-12-27 PROCEDURE — 82947 ASSAY GLUCOSE BLOOD QUANT: CPT

## 2020-12-27 RX ORDER — DEXTROSE 50 % IN WATER 50 %
25 SYRINGE (ML) INTRAVENOUS ONCE
Refills: 0 | Status: DISCONTINUED | OUTPATIENT
Start: 2020-12-27 | End: 2020-12-27

## 2020-12-27 RX ORDER — OXYCODONE HYDROCHLORIDE 5 MG/1
1 TABLET ORAL
Qty: 10 | Refills: 0
Start: 2020-12-27

## 2020-12-27 RX ORDER — IBUPROFEN 200 MG
1 TABLET ORAL
Qty: 0 | Refills: 0 | DISCHARGE
Start: 2020-12-27

## 2020-12-27 RX ORDER — INSULIN LISPRO 100/ML
VIAL (ML) SUBCUTANEOUS
Refills: 0 | Status: DISCONTINUED | OUTPATIENT
Start: 2020-12-27 | End: 2020-12-27

## 2020-12-27 RX ORDER — DEXTROSE 50 % IN WATER 50 %
12.5 SYRINGE (ML) INTRAVENOUS ONCE
Refills: 0 | Status: DISCONTINUED | OUTPATIENT
Start: 2020-12-27 | End: 2020-12-27

## 2020-12-27 RX ORDER — ACETAMINOPHEN 500 MG
3 TABLET ORAL
Qty: 0 | Refills: 0 | DISCHARGE
Start: 2020-12-27

## 2020-12-27 RX ORDER — SODIUM CHLORIDE 9 MG/ML
1000 INJECTION, SOLUTION INTRAVENOUS
Refills: 0 | Status: DISCONTINUED | OUTPATIENT
Start: 2020-12-27 | End: 2020-12-27

## 2020-12-27 RX ORDER — GLUCAGON INJECTION, SOLUTION 0.5 MG/.1ML
1 INJECTION, SOLUTION SUBCUTANEOUS ONCE
Refills: 0 | Status: DISCONTINUED | OUTPATIENT
Start: 2020-12-27 | End: 2020-12-27

## 2020-12-27 RX ORDER — DEXTROSE 50 % IN WATER 50 %
15 SYRINGE (ML) INTRAVENOUS ONCE
Refills: 0 | Status: DISCONTINUED | OUTPATIENT
Start: 2020-12-27 | End: 2020-12-27

## 2020-12-27 RX ADMIN — Medication 400 MILLIGRAM(S): at 00:50

## 2020-12-27 RX ADMIN — Medication 975 MILLIGRAM(S): at 11:17

## 2020-12-27 RX ADMIN — Medication 975 MILLIGRAM(S): at 10:47

## 2020-12-27 RX ADMIN — OXYCODONE HYDROCHLORIDE 5 MILLIGRAM(S): 5 TABLET ORAL at 12:28

## 2020-12-27 RX ADMIN — Medication 975 MILLIGRAM(S): at 03:48

## 2020-12-27 RX ADMIN — Medication 400 MILLIGRAM(S): at 05:56

## 2020-12-27 RX ADMIN — OXYCODONE HYDROCHLORIDE 5 MILLIGRAM(S): 5 TABLET ORAL at 13:00

## 2020-12-27 RX ADMIN — Medication 400 MILLIGRAM(S): at 06:35

## 2020-12-27 RX ADMIN — Medication 975 MILLIGRAM(S): at 04:23

## 2020-12-27 NOTE — CHART NOTE - NSCHARTNOTEFT_GEN_A_CORE
CTAP 12/26 demonstrated incidental 0.5cm nodule in right lower lung. Findings discussed with patient. She was instructed to follow up with her primary care provider and she was given a copy of the report.    Margi Carranza  Acute Care Surgery [phone]

## 2020-12-27 NOTE — DISCHARGE NOTE PROVIDER - NSDCFUADDINST_GEN_ALL_CORE_FT
PLEASE follow up with your primary care physician regarding the right lower lung nodule found on CT scan.      WOUND CARE:  Please keep incisions clean and dry. Please do not Scrub or rub incisions. Do not use lotion or powder on incisions.  BATHING: You may shower and/or sponge bathe. You may use warm soapy water in the shower and rinse, pat dry. ACTIVITY: No heavy lifting or straining. Otherwise, you may return to your usual level of physical activity. If you are taking narcotic pain medication DO NOT drive a car, operate machinery or make important decisions. DIET: Return to your usual diet. NOTIFY YOUR SURGEON IF YOU HAVE: any bleeding that does not stop, any pus draining from your wound(s), any fever (over 100.4 F) persistent nausea/vomiting, or if your pain is not controlled on your discharge pain medications, unable to urinate. Please follow up with your primary care physician in one week regarding your hospitalization, bring copies of your discharge paperwork. Please follow up with your surgeon, Dr. Yates in 1-2 weeks.

## 2020-12-27 NOTE — PROGRESS NOTE ADULT - SUBJECTIVE AND OBJECTIVE BOX
Surgery Progress Note    SUBJECTIVE: Pt seen and examined at bedside. Patient comfortable and in no-apparent distress. No nausea, vomiting, diarrhea. Pain is controlled. Tolerating diet.    Vital Signs Last 24 Hrs  T(C): 37.1 (27 Dec 2020 04:25), Max: 37.5 (26 Dec 2020 21:06)  T(F): 98.7 (27 Dec 2020 04:25), Max: 99.5 (26 Dec 2020 21:06)  HR: 82 (27 Dec 2020 04:25) (80 - 103)  BP: 111/65 (27 Dec 2020 04:25) (104/64 - 151/83)  BP(mean): 100 (27 Dec 2020 01:00) (90 - 100)  RR: 19 (27 Dec 2020 04:25) (15 - 20)  SpO2: 97% (27 Dec 2020 04:25) (95% - 102%)    Physical Exam:  General Appearance: Appears well, NAD  Respiratory: No labored breathing  CV: Pulse regularly present  Abdomen: Soft, nontender, nondistended; port incisions with dressings c/d/i    LABS:                        14.5   9.64  )-----------( 196      ( 26 Dec 2020 12:32 )             43.8         137  |  100  |  18  ----------------------------<  170<H>  4.1   |  22  |  0.73    Ca    10.3      26 Dec 2020 12:30    TPro  7.7  /  Alb  4.6  /  TBili  0.5  /  DBili  x   /  AST  13  /  ALT  18  /  AlkPhos  76      PT/INR - ( 26 Dec 2020 12:32 )   PT: 13.3 sec;   INR: 1.11 ratio         PTT - ( 26 Dec 2020 12:32 )  PTT:36.5 sec  Urinalysis Basic - ( 26 Dec 2020 14:12 )    Color: Colorless / Appearance: Clear / S.004 / pH: x  Gluc: x / Ketone: Negative  / Bili: Negative / Urobili: Negative   Blood: x / Protein: Negative / Nitrite: Negative   Leuk Esterase: Negative / RBC: 8 /hpf / WBC 6 /HPF   Sq Epi: x / Non Sq Epi: 1 /hpf / Bacteria: Negative        INs and OUTs:    20 @ 07:20 @ 07:00  --------------------------------------------------------  IN: 775 mL / OUT: 1050 mL / NET: -275 mL    20 @ 07:  -  20 @ 09:51  --------------------------------------------------------  IN: 0 mL / OUT: 800 mL / NET: -800 mL

## 2020-12-27 NOTE — PROGRESS NOTE ADULT - ASSESSMENT
ASSESSMENT:  Pt is a very pleasant 60 year old lady with history of DM type II, obesity, HLD, anxiety, and hypothyroidism presenting with acute uncomplicated appendicitis now s/p laparoscopic appendectomy 12/26, recovering well.    PLAN:  - Diet: regular  - pain control with oral medications  - DVT ppx  - dc home today    Acute Care Surgery [phone]

## 2020-12-27 NOTE — DISCHARGE NOTE PROVIDER - NSDCMRMEDTOKEN_GEN_ALL_CORE_FT
acetaminophen 325 mg oral tablet: 3 tab(s) orally every 6 hours  CeleXA 40 mg oral tablet: 1 tab(s) orally once a day  ibuprofen 400 mg oral tablet: 1 tab(s) orally every 6 hours  Januvia 100 mg oral tablet: 1 tab(s) orally once a day  Lipitor 20 mg oral tablet: 1 tab(s) orally once a day  oxyCODONE 5 mg oral tablet: 1 tab(s) orally every 6 hours MDD:4 tablets daily as needed for breakthrough pain  Synthroid 88 mcg (0.088 mg) oral tablet: 1 tab(s) orally once a day

## 2020-12-27 NOTE — DISCHARGE NOTE PROVIDER - HOSPITAL COURSE
Audrey is a very pleasant 60 year old lady with history of DM type II, obesity, HLD, anxiety, and hypothyroidism presenting with abdominal pain. Pt states she first noticed vague epigastric pain 4 days ago, which resolved the next day. Pain recurred and became more severe as well as more socorro-umbilical in location yesterday. As pain had not resolved, pt presented to ED today. States pain is currently 4/10 in severity. Also endorses nausea but denies any emesis. Reports no changes in bowel function, last BM this morning reportedly normal. Last PO intake was at 10AM this morning. Denies any other complaints including recent illness/sick contacts, fevers/chills, chest pain/shortness of breath, vomiting, diarrhea/constipation.     CT demonstrated acute appendicits with appendicoliths. Pt underwent laparoscopic appendectomy with Dr. Yates on 12/26/20. Pt tolerated procedure well on the floor. On the day of discharge, pt was tolerating a diet and pain was controlled on oral medications. She will need to follow up with Dr. Yates in 1-2 weeks.      CT also revealed 0.5cm right lower lung nodule, recommended dedicated chest CT for follow up. She will need to follow up with her primary care physician to discuss these findings.

## 2020-12-27 NOTE — DISCHARGE NOTE PROVIDER - CARE PROVIDER_API CALL
Ty Yaets)  Surgery; Surgical Critical Care  [address] [place] [address]  Phone: [phone]  Fax: [phone]  Follow Up Time: 2 weeks

## 2020-12-27 NOTE — DISCHARGE NOTE PROVIDER - NSDCCPCAREPLAN_GEN_ALL_CORE_FT
PRINCIPAL DISCHARGE DIAGNOSIS  Diagnosis: Acute appendicitis  Assessment and Plan of Treatment: .WOUND CARE:  Please keep incisions clean and dry. Please do not Scrub or rub incisions. Do not use lotion or powder on incisions.   BATHING: You may shower and/or sponge bathe. You may use warm soapy water in the shower and rinse, pat dry.  ACTIVITY: No heavy lifting or straining. Otherwise, you may return to your usual level of physical activity. If you are taking narcotic pain medication DO NOT drive a car, operate machinery or make important decisions.  DIET: Return to your usual diet.  NOTIFY YOUR SURGEON IF YOU HAVE: any bleeding that does not stop, any pus draining from your wound(s), any fever (over 100.4 F) persistent nausea/vomiting, or if your pain is not controlled on your discharge pain medications, unable to urinate.  Please follow up with your primary care physician in one week regarding your hospitalization, bring copies of your discharge paperwork.  Please follow up with your surgeon, Dr. Yates in 1-2 weeks.

## 2020-12-27 NOTE — DISCHARGE NOTE NURSING/CASE MANAGEMENT/SOCIAL WORK - PATIENT PORTAL LINK FT
You can access the FollowMyHealth Patient Portal offered by [facility] by registering at the following website: [email or web address]. By joining [employer]’s FollowMyHealth portal, you will also be able to view your health information using other applications (apps) compatible with our system.

## 2020-12-27 NOTE — DISCHARGE NOTE PROVIDER - CARE PROVIDERS DIRECT ADDRESSES
[email or web address]

## 2020-12-30 LAB — SURGICAL PATHOLOGY STUDY: SIGNIFICANT CHANGE UP

## 2020-12-31 PROBLEM — E11.9 TYPE 2 DIABETES MELLITUS WITHOUT COMPLICATIONS: Chronic | Status: ACTIVE | Noted: 2020-12-26

## 2020-12-31 PROBLEM — E78.5 HYPERLIPIDEMIA, UNSPECIFIED: Chronic | Status: ACTIVE | Noted: 2020-12-26

## 2020-12-31 PROBLEM — E66.9 OBESITY, UNSPECIFIED: Chronic | Status: ACTIVE | Noted: 2020-12-26

## 2020-12-31 PROBLEM — F41.9 ANXIETY DISORDER, UNSPECIFIED: Chronic | Status: ACTIVE | Noted: 2020-12-26

## 2020-12-31 PROBLEM — E03.9 HYPOTHYROIDISM, UNSPECIFIED: Chronic | Status: ACTIVE | Noted: 2020-12-26

## 2021-01-11 ENCOUNTER — APPOINTMENT (OUTPATIENT)
Dept: ENDOCRINOLOGY | Facility: CLINIC | Age: 61
End: 2021-01-11
Payer: COMMERCIAL

## 2021-01-11 VITALS
OXYGEN SATURATION: 97 % | HEART RATE: 76 BPM | WEIGHT: 200 LBS | TEMPERATURE: 98.1 F | RESPIRATION RATE: 16 BRPM | HEIGHT: 63 IN | DIASTOLIC BLOOD PRESSURE: 70 MMHG | SYSTOLIC BLOOD PRESSURE: 120 MMHG | BODY MASS INDEX: 35.44 KG/M2

## 2021-01-11 VITALS
OXYGEN SATURATION: 97 % | SYSTOLIC BLOOD PRESSURE: 120 MMHG | TEMPERATURE: 98.1 F | BODY MASS INDEX: 35.44 KG/M2 | RESPIRATION RATE: 16 BRPM | WEIGHT: 200 LBS | DIASTOLIC BLOOD PRESSURE: 70 MMHG | HEIGHT: 63 IN | HEART RATE: 76 BPM

## 2021-01-11 LAB — HBA1C MFR BLD HPLC: 5.5

## 2021-01-11 PROCEDURE — 99072 ADDL SUPL MATRL&STAF TM PHE: CPT

## 2021-01-11 PROCEDURE — 99214 OFFICE O/P EST MOD 30 MIN: CPT | Mod: 25

## 2021-01-11 PROCEDURE — 83036 HEMOGLOBIN GLYCOSYLATED A1C: CPT | Mod: QW

## 2021-01-11 NOTE — PHYSICAL EXAM
[Alert] : alert [No Acute Distress] : no acute distress [Normal Sclera/Conjunctiva] : normal sclera/conjunctiva [EOMI] : extra ocular movement intact [No LAD] : no lymphadenopathy [Thyroid Not Enlarged] : the thyroid was not enlarged [No Respiratory Distress] : no respiratory distress [Clear to Auscultation] : lungs were clear to auscultation bilaterally [Normal S1, S2] : normal S1 and S2 [Regular Rhythm] : with a regular rhythm [No Edema] : no peripheral edema [Pedal Pulses Normal] : the pedal pulses are present [Normal Bowel Sounds] : normal bowel sounds [Not Tender] : non-tender [Not Distended] : not distended [Soft] : abdomen soft [Normal Anterior Cervical Nodes] : no anterior cervical lymphadenopathy [No Clubbing, Cyanosis] : no clubbing  or cyanosis of the fingernails [No Rash] : no rash [Right Foot Was Examined] : right foot ~C was examined [Left Foot Was Examined] : left foot ~C was examined [Normal] : normal [2+] : 2+ in the dorsalis pedis [Normal Reflexes] : deep tendon reflexes were 2+ and symmetric [Normal Affect] : the affect was normal [Normal Mood] : the mood was normal [Kyphosis] : no kyphosis present [Diminished Throughout Both Feet] : normal tactile sensation with monofilament testing throughout both feet [FreeTextEntry1] : callus [FreeTextEntry5] : callus

## 2021-01-11 NOTE — DATA REVIEWED
[FreeTextEntry1] : Labs\par 12/26/2020\par H/H 14.5/43.8\par BUN/cr 18/0.73\par calcium 10.3\par AST 13 ALT 18

## 2021-01-11 NOTE — ASSESSMENT
[Carbohydrate Consistent Diet] : carbohydrate consistent diet [Long Term Vascular Complications] : long term vascular complications of diabetes [Importance of Diet and Exercise] : importance of diet and exercise to improve glycemic control, achieve weight loss and improve cardiovascular health [FreeTextEntry1] : 60 y.o. female with h/o Type 2 DM, hyperlipidemia, hypothyroidism and thyroid nodules.\par \par 1. Type 2 DM- Excellent control with Hba1c of 5.5% today. Encouraged carbohydrate consistent diet and exercise. Will continue Januvia 100 mg daily. Stable CMP and urine microalb/cr ratio. \par \par 2. BP is at goal and normal urine microalb/cr ratio in March 2020\par \par 3. Hyperlipidemia- Lipids are at goal and will continue statin.\par \par 4. Hypothyroidism- Patient is euthyroid and will continue LT4 88 mcg daily\par \par 5. Thyroid nodules- Will monitor for now and repeat thyroid ultrasound now\par \par 6. Vitamin D def/Primary hyperparathyroidism- Normal serum calcium and phosphorus with stable intact PTH and 25 vitamin D level. Will continue vitamin D3 1,000 Iu daily.\par \par 7. Obesity- Encouraged exercise and portion control. \par \par 8. Osteopenia- Average risk of fracture. Will monitor for now. Recommend repeat DEXA scan in 2022. Encouraged weight bearing activity. Normal 25 vitamin D level. \par \par Follow up in 3-4 months

## 2021-01-11 NOTE — REVIEW OF SYSTEMS
[Dysphonia] : dysphonia [Joint Pain] : joint pain [Negative] : Endocrine [Abdominal Pain] : abdominal pain [Anxiety] : anxiety [Fatigue] : no fatigue [Recent Weight Gain (___ Lbs)] : no recent weight gain [Recent Weight Loss (___ Lbs)] : no recent weight loss [Dysphagia] : no dysphagia [Polyuria] : no polyuria [Hair Loss] : no hair loss [Pain/Numbness of Digits] : no pain/numbness of digits [Swelling] : no swelling

## 2021-01-11 NOTE — HISTORY OF PRESENT ILLNESS
[FreeTextEntry1] : 60 y.o. female with h/o Type 2 DM, hypothyroidism, hyperlipidemia here for follow up visit. Had appendectomy on 12/26/2020. Feeling better. Now has minimal abdominal pain. \par \par No fatigue. Had been doing better with diet called [employer]. Weight is stable since last visit. Eating 8 shant snacks every 3 hours with one lean green meal. No sweets. Taking Januvia 100 mg daily since April 2020. Off Jardiance 25 mg daily because of yeast infection. Intolerant to Metformin and Trulicity in the past. Stopped Phentermine. Not using Freestyle Addison now. Monitoring FS on occasion and mornings FS are 140s to 160 and during the day are 110s. No hypoglycemia. UTD with opthalmology  and no retinopathy. No neuropathy. Follows with podiatry. No proteinuria. Following with GI for fatty liver. No exercise. Knee pain is better now, saw orthopedics. Completed PT. Seeing nutritionist through [employer].\par \par Hyperlipidemia- taking Atorvastatin daily\par \par Regarding thyroid disease, no neck complaints. Taking LT4 88 mcg daily. Thyroid sonogram in January 2019 showed stable b/l subcentimeter thyroid nodules with largest in the left lower pole which is 1.2 cm. Thyroid sonogram on 7/8/2020 shows mild increase in size of left lower pole nodule to 1.6 cm. Left mid pole nodule is stable at 0.6 cm. There is a new right 0.7 cm hypoechoic nodule.  C/o raspy voice at times. \par \par Also h/o hypercalcemia most likely primary hyperparathyroidism. No h/o kidney stones. No polyuria and polydipsia.  Taking vitamin D 1,000 Iu daily. Had DEXA scan in March 2017 with GYN which showed spine -0.1 and femoral neck -0.7. DEXA in March 2018 showed femoral neck -1.8, total hip -1.0, 1/3 radius -0.8 and spine -0.5. DEXA scan in 9/2020 showed 1/3 radius -1.2 with 4.1% decrease, spine -0.2 which is stable and left femoral neck -1.1 and total hip -0.4 which are stable. No falls or fractures.\par \par Saw dermatology for hair loss. Hair loss is androgenic in origin. Hair loss has resolved and taking MVI.  [Hypoglycemia] : not hypoglycemic

## 2021-01-14 ENCOUNTER — APPOINTMENT (OUTPATIENT)
Dept: TRAUMA SURGERY | Facility: CLINIC | Age: 61
End: 2021-01-14

## 2021-01-27 ENCOUNTER — APPOINTMENT (OUTPATIENT)
Dept: TRAUMA SURGERY | Facility: CLINIC | Age: 61
End: 2021-01-27
Payer: COMMERCIAL

## 2021-01-27 VITALS
TEMPERATURE: 97.3 F | BODY MASS INDEX: 36.32 KG/M2 | DIASTOLIC BLOOD PRESSURE: 76 MMHG | HEIGHT: 63 IN | HEART RATE: 81 BPM | WEIGHT: 205 LBS | SYSTOLIC BLOOD PRESSURE: 119 MMHG

## 2021-01-27 PROCEDURE — 99024 POSTOP FOLLOW-UP VISIT: CPT

## 2021-01-27 NOTE — HISTORY OF PRESENT ILLNESS
[de-identified] : Ms. Sinclair is a 61y/o woman who was recently admitted with acute appendicitis and underwent lap appendectomy. She returns for followup. She is feeling very well, having no pain. She has a normal appetite and normal bowel function. No fevers or chills.\par \par Path reviewed.\par Abd soft, nontender, nondistended. Lap appy incisions well-healed.\par \par Return to office prn.

## 2021-03-28 ENCOUNTER — RX RENEWAL (OUTPATIENT)
Age: 61
End: 2021-03-28

## 2021-05-11 ENCOUNTER — APPOINTMENT (OUTPATIENT)
Dept: ENDOCRINOLOGY | Facility: CLINIC | Age: 61
End: 2021-05-11
Payer: COMMERCIAL

## 2021-05-11 VITALS
SYSTOLIC BLOOD PRESSURE: 120 MMHG | RESPIRATION RATE: 16 BRPM | WEIGHT: 210 LBS | DIASTOLIC BLOOD PRESSURE: 85 MMHG | HEART RATE: 77 BPM | HEIGHT: 63 IN | TEMPERATURE: 98.3 F | BODY MASS INDEX: 37.21 KG/M2 | OXYGEN SATURATION: 96 %

## 2021-05-11 VITALS — DIASTOLIC BLOOD PRESSURE: 70 MMHG | SYSTOLIC BLOOD PRESSURE: 120 MMHG

## 2021-05-11 LAB — HBA1C MFR BLD HPLC: 5.8

## 2021-05-11 PROCEDURE — 36415 COLL VENOUS BLD VENIPUNCTURE: CPT

## 2021-05-11 PROCEDURE — 99072 ADDL SUPL MATRL&STAF TM PHE: CPT

## 2021-05-11 PROCEDURE — 99215 OFFICE O/P EST HI 40 MIN: CPT | Mod: 25

## 2021-05-11 PROCEDURE — 83036 HEMOGLOBIN GLYCOSYLATED A1C: CPT | Mod: QW

## 2021-05-11 NOTE — REVIEW OF SYSTEMS
[Dysphonia] : dysphonia [Joint Pain] : joint pain [Anxiety] : anxiety [Fatigue] : no fatigue [Recent Weight Gain (___ Lbs)] : recent weight gain: [unfilled] lbs [Recent Weight Loss (___ Lbs)] : no recent weight loss [Dysphagia] : no dysphagia [Polyuria] : no polyuria [Hair Loss] : no hair loss [Pain/Numbness of Digits] : no pain/numbness of digits [Swelling] : no swelling [Negative] : Gastrointestinal

## 2021-05-11 NOTE — ASSESSMENT
[Carbohydrate Consistent Diet] : carbohydrate consistent diet [Long Term Vascular Complications] : long term vascular complications of diabetes [Importance of Diet and Exercise] : importance of diet and exercise to improve glycemic control, achieve weight loss and improve cardiovascular health [FreeTextEntry1] : 61 y.o. female with h/o Type 2 DM, hyperlipidemia, hypothyroidism and thyroid nodules.\par \par 1. Type 2 DM- Excellent control with Hba1c of 5.8% today. Encouraged carbohydrate consistent diet and exercise. Will continue Januvia 100 mg daily. Will check CMP and urine microalb/cr ratio. \par \par 2. BP is at goal and normal urine microalb/cr ratio in March 2020\par \par 3. Hyperlipidemia- Will check lipids and will continue statin.\par \par 4. Hypothyroidism- Patient appears euthyroid and will continue LT4 88 mcg daily for now. Will check TFTs. \par \par 5. Thyroid nodules- Will monitor for now and repeat thyroid ultrasound in 2022\par \par 6. Vitamin D def/Primary hyperparathyroidism- Will check serum calcium and phosphorus with intact PTH and 25 vitamin D level. Will remain off supplement\par \par 7. Obesity- Encouraged exercise and portion control. Intolerant to Metformin and GLP-1 analog. Stopped phentermine. Not interested in bariatric surgery. Will refer to medical weight management program. \par \par 8. Osteopenia- Average risk of fracture. Will monitor for now. Recommend repeat DEXA scan in 2022. Encouraged weight bearing activity. Normal 25 vitamin D level. \par \par Follow up in 3-4 months\par Labs drawn in the office today

## 2021-05-11 NOTE — HISTORY OF PRESENT ILLNESS
[FreeTextEntry1] : 61 y.o. female with h/o Type 2 DM, hypothyroidism, hyperlipidemia here for follow up visit. Had appendectomy on 12/26/2020. No acute events since last visit. \par \par No fatigue. Was doing well with diet called Optavia. Weight is up since last visit because of increase in portions. Eating 8 shant snacks every 3 hours with one lean green meal. No sweets. Taking Januvia 100 mg daily since April 2020. Off Jardiance 25 mg daily because of yeast infection. Intolerant to Metformin and Trulicity in the past. Stopped Phentermine. Not using Freestyle Addison now. Monitoring FS on occasion and mornings FS are 130s to 160 and during the day are 110s. No hypoglycemia. UTD with opthalmology  and no retinopathy. No neuropathy. Follows with podiatry. No proteinuria. Following with GI for fatty liver. No exercise. Knee pain is better now, saw orthopedics. Completed PT. Seeing nutritionist through Optavia.\par \par Hyperlipidemia- taking Atorvastatin daily\par \par Regarding thyroid disease, no neck complaints. Taking LT4 88 mcg daily. Thyroid sonogram in January 2019 showed stable b/l subcentimeter thyroid nodules with largest in the left lower pole which is 1.2 cm. Thyroid sonogram on 7/8/2020 shows mild increase in size of left lower pole nodule to 1.6 cm. Left mid pole nodule is stable at 0.6 cm. There is a new right 0.7 cm hypoechoic nodule. Thyroid sonogram on 4/19/21 showed stable right 0.8 cm nodule, stable left mid pole 0.7 cm nodule and mild increase in left lower pole nodule to 1.8 cm.  C/o raspy voice at times. \par \par Also h/o hypercalcemia most likely primary hyperparathyroidism. No h/o kidney stones. No polyuria and polydipsia. Had DEXA scan in March 2017 with GYN which showed spine -0.1 and femoral neck -0.7. DEXA in March 2018 showed femoral neck -1.8, total hip -1.0, 1/3 radius -0.8 and spine -0.5. DEXA scan in 9/2020 showed 1/3 radius -1.2 with 4.1% decrease, spine -0.2 which is stable and left femoral neck -1.1 and total hip -0.4 which are stable. No falls or fractures.\par \par Saw dermatology for hair loss. Hair loss is androgenic in origin. Hair loss has resolved and taking MVI.  [Hypoglycemia] : not hypoglycemic

## 2021-05-12 LAB
25(OH)D3 SERPL-MCNC: 47.5 NG/ML
ALBUMIN SERPL ELPH-MCNC: 5 G/DL
ALP BLD-CCNC: 70 U/L
ALT SERPL-CCNC: 22 U/L
ANION GAP SERPL CALC-SCNC: 12 MMOL/L
AST SERPL-CCNC: 17 U/L
BASOPHILS # BLD AUTO: 0.06 K/UL
BASOPHILS NFR BLD AUTO: 0.8 %
BILIRUB SERPL-MCNC: 0.3 MG/DL
BUN SERPL-MCNC: 22 MG/DL
CALCIUM SERPL-MCNC: 10.5 MG/DL
CALCIUM SERPL-MCNC: 10.5 MG/DL
CHLORIDE SERPL-SCNC: 100 MMOL/L
CHOLEST SERPL-MCNC: 169 MG/DL
CO2 SERPL-SCNC: 27 MMOL/L
CREAT SERPL-MCNC: 0.73 MG/DL
CREAT SPEC-SCNC: 25 MG/DL
EOSINOPHIL # BLD AUTO: 0.15 K/UL
EOSINOPHIL NFR BLD AUTO: 1.9 %
GLUCOSE SERPL-MCNC: 106 MG/DL
HCT VFR BLD CALC: 44.4 %
HDLC SERPL-MCNC: 58 MG/DL
HGB BLD-MCNC: 14.4 G/DL
IMM GRANULOCYTES NFR BLD AUTO: 0.3 %
LDLC SERPL CALC-MCNC: 83 MG/DL
LYMPHOCYTES # BLD AUTO: 2.18 K/UL
LYMPHOCYTES NFR BLD AUTO: 27.4 %
MAN DIFF?: NORMAL
MCHC RBC-ENTMCNC: 29.6 PG
MCHC RBC-ENTMCNC: 32.4 GM/DL
MCV RBC AUTO: 91.2 FL
MICROALBUMIN 24H UR DL<=1MG/L-MCNC: <1.2 MG/DL
MICROALBUMIN/CREAT 24H UR-RTO: NORMAL MG/G
MONOCYTES # BLD AUTO: 0.55 K/UL
MONOCYTES NFR BLD AUTO: 6.9 %
NEUTROPHILS # BLD AUTO: 4.99 K/UL
NEUTROPHILS NFR BLD AUTO: 62.7 %
NONHDLC SERPL-MCNC: 111 MG/DL
PARATHYROID HORMONE INTACT: 72 PG/ML
PHOSPHATE SERPL-MCNC: 3.3 MG/DL
PLATELET # BLD AUTO: 234 K/UL
POTASSIUM SERPL-SCNC: 5 MMOL/L
PROT SERPL-MCNC: 7.4 G/DL
RBC # BLD: 4.87 M/UL
RBC # FLD: 13.6 %
SODIUM SERPL-SCNC: 140 MMOL/L
T4 FREE SERPL-MCNC: 1.5 NG/DL
TRIGL SERPL-MCNC: 136 MG/DL
TSH SERPL-ACNC: 1.18 UIU/ML
WBC # FLD AUTO: 7.95 K/UL

## 2021-05-23 ENCOUNTER — RX RENEWAL (OUTPATIENT)
Age: 61
End: 2021-05-23

## 2021-06-02 ENCOUNTER — APPOINTMENT (OUTPATIENT)
Dept: BARIATRICS/WEIGHT MGMT | Facility: CLINIC | Age: 61
End: 2021-06-02
Payer: COMMERCIAL

## 2021-06-02 DIAGNOSIS — E11.65 TYPE 2 DIABETES MELLITUS WITH HYPERGLYCEMIA: ICD-10-CM

## 2021-06-02 PROCEDURE — 99204 OFFICE O/P NEW MOD 45 MIN: CPT | Mod: 95

## 2021-06-09 ENCOUNTER — APPOINTMENT (OUTPATIENT)
Dept: BARIATRICS/WEIGHT MGMT | Facility: CLINIC | Age: 61
End: 2021-06-09

## 2021-07-30 ENCOUNTER — NON-APPOINTMENT (OUTPATIENT)
Age: 61
End: 2021-07-30

## 2021-08-17 PROBLEM — E11.65 DIABETES MELLITUS TYPE 2, UNCONTROLLED: Status: ACTIVE | Noted: 2018-11-19

## 2021-08-17 NOTE — HISTORY OF PRESENT ILLNESS
[FreeTextEntry1] : 62 yo woman with obesity, hypothyroidism, anxiety and DM2 presents for initial obesity medicine consult\par \par max weight = 250lbs\par current weight is 210 lbs - \par she has been on optavia for 2 years.  loses weight then stalls and has regained weight\par \par sleep:  gets up to urinate during night, snores some\par \par food: does not drink etoh, eats 3 meals and snacks in evening\par \par phys activity: has treadmill and light weights, not currently exercising\par \par Please refer to intake forms for complete weight and related history.\par

## 2021-08-17 NOTE — ASSESSMENT
[FreeTextEntry1] : 60 yo woman with obesity, hypothyroidism, anxiety and DM2\par \par Recommend the following:\par reviewed metabolic labs\par whole foods based eating strategy limiting refined carbs and added fats\par keep food journal\par track daily activity\par needs daily physical activity\par consider glp-1 agonist over januvia\par rtc in 4 weeks.\par

## 2021-08-17 NOTE — REASON FOR VISIT
[Home] : at home, [unfilled] , at the time of the visit. [Other Location: e.g. Home (Enter Location, City,State)___] : at [unfilled] [Verbal consent obtained from patient] : the patient, [unfilled] [Initial Consultation] : an initial consultation for [Diabetes Mellitus] : diabetes mellitus [Obesity] : obesity

## 2021-09-10 ENCOUNTER — NON-APPOINTMENT (OUTPATIENT)
Age: 61
End: 2021-09-10

## 2021-09-13 ENCOUNTER — APPOINTMENT (OUTPATIENT)
Dept: ENDOCRINOLOGY | Facility: CLINIC | Age: 61
End: 2021-09-13
Payer: COMMERCIAL

## 2021-09-13 VITALS
HEART RATE: 80 BPM | OXYGEN SATURATION: 97 % | WEIGHT: 207 LBS | TEMPERATURE: 97.2 F | RESPIRATION RATE: 16 BRPM | BODY MASS INDEX: 36.68 KG/M2 | SYSTOLIC BLOOD PRESSURE: 130 MMHG | DIASTOLIC BLOOD PRESSURE: 70 MMHG | HEIGHT: 63 IN

## 2021-09-13 LAB — HBA1C MFR BLD HPLC: 5.6

## 2021-09-13 PROCEDURE — 99215 OFFICE O/P EST HI 40 MIN: CPT | Mod: 25

## 2021-09-13 PROCEDURE — 83036 HEMOGLOBIN GLYCOSYLATED A1C: CPT | Mod: QW

## 2021-09-13 PROCEDURE — 36415 COLL VENOUS BLD VENIPUNCTURE: CPT

## 2021-09-13 NOTE — REVIEW OF SYSTEMS
[Joint Pain] : joint pain [Anxiety] : anxiety [Negative] : Endocrine [Fatigue] : no fatigue [Recent Weight Gain (___ Lbs)] : no recent weight gain [Recent Weight Loss (___ Lbs)] : no recent weight loss [Dysphagia] : no dysphagia [Dysphonia] : no dysphonia [Polyuria] : no polyuria [Hair Loss] : no hair loss [Pain/Numbness of Digits] : no pain/numbness of digits [Swelling] : no swelling

## 2021-09-13 NOTE — HISTORY OF PRESENT ILLNESS
[___] : [unfilled] [FreeTextEntry1] : 61 y.o. female with h/o Type 2 DM, hypothyroidism, hyperlipidemia and osteopenia here for follow up visit. Had appendectomy on 12/26/2020. No acute events since last visit. \par \par No fatigue. Was doing well with diet called Optavia but now off it given GI symptoms. Weight is stable since last visit because of increase in portions.  No sweets. Taking Januvia 100 mg daily since April 2020. Off Jardiance 25 mg daily because of yeast infection. Intolerant to Metformin and Trulicity in the past. Stopped Phentermine. Not using Freestyle Addison now. Monitoring FS on occasion.  No hypoglycemia. UTD with opthalmology  and no retinopathy. No neuropathy. Follows with podiatry. No proteinuria. Following with GI for fatty liver. No exercise. Knee pain is better now, saw orthopedics. Completed PT. C/o right hip pain.\par \par Hyperlipidemia- taking Atorvastatin daily\par \par Regarding thyroid disease, no neck complaints. Taking LT4 88 mcg daily. Thyroid sonogram in January 2019 showed stable b/l subcentimeter thyroid nodules with largest in the left lower pole which is 1.2 cm. Thyroid sonogram on 7/8/2020 shows mild increase in size of left lower pole nodule to 1.6 cm. Left mid pole nodule is stable at 0.6 cm. There is a new right 0.7 cm hypoechoic nodule. Thyroid sonogram on 4/19/21 showed stable right 0.8 cm nodule, stable left mid pole 0.7 cm nodule and mild increase in left lower pole nodule to 1.8 cm.  C/o raspy voice at times. \par \par Also h/o hypercalcemia most likely primary hyperparathyroidism. No h/o kidney stones. No polyuria and polydipsia. Had DEXA scan in March 2017 with GYN which showed spine -0.1 and femoral neck -0.7. DEXA in March 2018 showed femoral neck -1.8, total hip -1.0, 1/3 radius -0.8 and spine -0.5. DEXA scan in 9/2020 showed 1/3 radius -1.2 with 4.1% decrease, spine -0.2 which is stable and left femoral neck -1.1 and total hip -0.4 which are stable. No falls or fractures.\par \par Saw dermatology for hair loss. Hair loss is androgenic in origin. Hair loss has resolved and taking MVI.  [Hypoglycemia] : not hypoglycemic

## 2021-09-13 NOTE — ASSESSMENT
[Carbohydrate Consistent Diet] : carbohydrate consistent diet [Long Term Vascular Complications] : long term vascular complications of diabetes [Importance of Diet and Exercise] : importance of diet and exercise to improve glycemic control, achieve weight loss and improve cardiovascular health [FreeTextEntry1] : 61 y.o. female with h/o Type 2 DM, hyperlipidemia, hypothyroidism and thyroid nodules.\par \par 1. Type 2 DM- Excellent control with Hba1c of 5.6% today. Encouraged carbohydrate consistent diet and exercise. Will continue Januvia 100 mg daily. Will check CMP and urine microalb/cr ratio. \par \par 2. BP is at goal and normal urine microalb/cr ratio in May 2021\par \par 3. Hyperlipidemia- Will check lipids and will continue statin.\par \par 4. Hypothyroidism- Patient appears euthyroid and will continue LT4 88 mcg daily for now. Will check TFTs. \par \par 5. Thyroid nodules- Will monitor for now and repeat thyroid ultrasound in 2022\par \par 6. Vitamin D def/Primary hyperparathyroidism- Will check serum calcium and phosphorus with intact PTH and 25 vitamin D level. Will remain off supplement. May need to consider surgery given hypercalcemia\par \par 7. Obesity- Encouraged exercise and portion control. Intolerant to Metformin and GLP-1 analog. Would like to retry phentermine but start at lower dose of 15 mg daily to limit anxiety. Not interested in bariatric surgery. Did see medical weight management program. \par \par 8. Osteopenia- Average risk of fracture. Will monitor for now. Recommend repeat DEXA scan in 2022. Encouraged weight bearing activity. Normal 25 vitamin D level. \par \par Follow up in 3-4 months\par Labs drawn in the office today

## 2021-09-15 LAB
25(OH)D3 SERPL-MCNC: 46.1 NG/ML
ALBUMIN SERPL ELPH-MCNC: 4.9 G/DL
ALP BLD-CCNC: 71 U/L
ALT SERPL-CCNC: 27 U/L
ANION GAP SERPL CALC-SCNC: 16 MMOL/L
AST SERPL-CCNC: 16 U/L
BASOPHILS # BLD AUTO: 0.03 K/UL
BASOPHILS NFR BLD AUTO: 0.4 %
BILIRUB SERPL-MCNC: 0.3 MG/DL
BUN SERPL-MCNC: 17 MG/DL
CALCIUM SERPL-MCNC: 10.4 MG/DL
CALCIUM SERPL-MCNC: 10.4 MG/DL
CHLORIDE SERPL-SCNC: 104 MMOL/L
CHOLEST SERPL-MCNC: 179 MG/DL
CO2 SERPL-SCNC: 21 MMOL/L
CREAT SERPL-MCNC: 0.73 MG/DL
CREAT SPEC-SCNC: 41 MG/DL
EOSINOPHIL # BLD AUTO: 0.15 K/UL
EOSINOPHIL NFR BLD AUTO: 2 %
GLUCOSE SERPL-MCNC: 105 MG/DL
HCT VFR BLD CALC: 46 %
HDLC SERPL-MCNC: 56 MG/DL
HGB BLD-MCNC: 14.1 G/DL
IMM GRANULOCYTES NFR BLD AUTO: 0.3 %
LDLC SERPL CALC-MCNC: 98 MG/DL
LYMPHOCYTES # BLD AUTO: 1.93 K/UL
LYMPHOCYTES NFR BLD AUTO: 25.5 %
MAN DIFF?: NORMAL
MCHC RBC-ENTMCNC: 29 PG
MCHC RBC-ENTMCNC: 30.7 GM/DL
MCV RBC AUTO: 94.5 FL
MICROALBUMIN 24H UR DL<=1MG/L-MCNC: <1.2 MG/DL
MICROALBUMIN/CREAT 24H UR-RTO: NORMAL MG/G
MONOCYTES # BLD AUTO: 0.49 K/UL
MONOCYTES NFR BLD AUTO: 6.5 %
NEUTROPHILS # BLD AUTO: 4.96 K/UL
NEUTROPHILS NFR BLD AUTO: 65.3 %
NONHDLC SERPL-MCNC: 122 MG/DL
PARATHYROID HORMONE INTACT: 66 PG/ML
PHOSPHATE SERPL-MCNC: 3.7 MG/DL
PLATELET # BLD AUTO: 233 K/UL
POTASSIUM SERPL-SCNC: 5.2 MMOL/L
PROT SERPL-MCNC: 7.1 G/DL
RBC # BLD: 4.87 M/UL
RBC # FLD: 14.1 %
SODIUM SERPL-SCNC: 141 MMOL/L
T4 FREE SERPL-MCNC: 1.5 NG/DL
TRIGL SERPL-MCNC: 118 MG/DL
TSH SERPL-ACNC: 0.66 UIU/ML
WBC # FLD AUTO: 7.58 K/UL

## 2021-11-26 ENCOUNTER — RX RENEWAL (OUTPATIENT)
Age: 61
End: 2021-11-26

## 2022-01-10 ENCOUNTER — APPOINTMENT (OUTPATIENT)
Dept: PULMONOLOGY | Facility: CLINIC | Age: 62
End: 2022-01-10
Payer: MEDICARE

## 2022-01-10 VITALS
BODY MASS INDEX: 38.09 KG/M2 | OXYGEN SATURATION: 97 % | HEART RATE: 79 BPM | SYSTOLIC BLOOD PRESSURE: 124 MMHG | HEIGHT: 63 IN | DIASTOLIC BLOOD PRESSURE: 72 MMHG | TEMPERATURE: 97.2 F | WEIGHT: 215 LBS | RESPIRATION RATE: 16 BRPM

## 2022-01-10 DIAGNOSIS — Z86.39 PERSONAL HISTORY OF OTHER ENDOCRINE, NUTRITIONAL AND METABOLIC DISEASE: ICD-10-CM

## 2022-01-10 DIAGNOSIS — R93.89 ABNORMAL FINDINGS ON DIAGNOSTIC IMAGING OF OTHER SPECIFIED BODY STRUCTURES: ICD-10-CM

## 2022-01-10 DIAGNOSIS — E66.3 OVERWEIGHT: ICD-10-CM

## 2022-01-10 DIAGNOSIS — R06.83 SNORING: ICD-10-CM

## 2022-01-10 DIAGNOSIS — Z87.39 PERSONAL HISTORY OF OTHER DISEASES OF THE MUSCULOSKELETAL SYSTEM AND CONNECTIVE TISSUE: ICD-10-CM

## 2022-01-10 DIAGNOSIS — Z86.19 PERSONAL HISTORY OF OTHER INFECTIOUS AND PARASITIC DISEASES: ICD-10-CM

## 2022-01-10 PROCEDURE — 94727 GAS DIL/WSHOT DETER LNG VOL: CPT

## 2022-01-10 PROCEDURE — 94618 PULMONARY STRESS TESTING: CPT

## 2022-01-10 PROCEDURE — ZZZZZ: CPT

## 2022-01-10 PROCEDURE — 99204 OFFICE O/P NEW MOD 45 MIN: CPT | Mod: 25

## 2022-01-10 PROCEDURE — 71046 X-RAY EXAM CHEST 2 VIEWS: CPT

## 2022-01-10 PROCEDURE — 95012 NITRIC OXIDE EXP GAS DETER: CPT

## 2022-01-10 PROCEDURE — 94729 DIFFUSING CAPACITY: CPT

## 2022-01-10 PROCEDURE — 94010 BREATHING CAPACITY TEST: CPT

## 2022-01-10 RX ORDER — FAMOTIDINE 40 MG/1
40 TABLET, FILM COATED ORAL
Qty: 90 | Refills: 1 | Status: ACTIVE | COMMUNITY
Start: 2022-01-10 | End: 1900-01-01

## 2022-01-10 RX ORDER — OFLOXACIN 3 MG/ML
0.3 SOLUTION/ DROPS OPHTHALMIC
Qty: 5 | Refills: 0 | Status: DISCONTINUED | COMMUNITY
Start: 2021-10-18

## 2022-01-10 RX ORDER — ALBUTEROL SULFATE 90 UG/1
108 (90 BASE) AEROSOL, METERED RESPIRATORY (INHALATION) EVERY 6 HOURS
Qty: 1 | Refills: 5 | Status: ACTIVE | COMMUNITY
Start: 2022-01-10 | End: 1900-01-01

## 2022-01-10 NOTE — ASSESSMENT
[FreeTextEntry1] : Ms. MARCUS is a 61 y.o  female with a history of  nonsmoker, DM, primary hyperparathyroid, osteopenia, thyroid nodules, HLD,  who presents into the office for an initial pulmonary evaluation for SOB, abnormal CT, GERD, snoring ?OSAS \par \par The patient's SOB is felt to be multifactorial:\par -poor mechanics of breathing\par -out of shape/overweight\par -Pulmonary\par    - abnormal CT \par    - mild asthma \par -Cardiac\par \par Problem 1: Mild Asthma \par - add Ventolin 2 puffs Q6H, pre-exercise \par - Asthma is believed to be caused by inherited (genetic) and environmental factor, but its exact cause is unknown. asthma may be triggered by allergens, lung infections, or irritants in the air. Asthma triggers are different for each person\par - Inhaler technique reviewed as well as oral hygiene technique reviewed with patient. Avoidance of cold air, extremes of temperature, rescue inhaler should be used before exercise. Order of medication reviewed with patient. Recommended use of a cool mist humidifier in the bedroom. \par \par \par Problem 2: GERD\par -add Pepcid 40 mg QHS\par -Rule of 2s: avoid eating too much, eating too late, eating too spicy, eating two hours before bed.\par - Things to avoid including overeating, spicy foods, tight clothing, eating within two hours of bed, this list is not all inclusive.\par - For treatments of reflux, possible options discussed including diet control, H2 blockers, PPIs, as well as coating motility agents discussed as treatment options. Timing of meals and proximity of last meal to sleep were discussed. If symptoms persist, a formal gastrointestinal evaluation is needed.\par \par \par Problem 3: Abnormal CT -  2 Nodules ?post inflammatory in nature due to chickenpox / measles as child\par - get f/p CT of chest noncontrast \par - CAT scans are the only radiological modality to identify abnormality to identify abnormalities w/in the lings with regards to nodules/masses/lymph nodes. Risks, benefits were reviewed in detail. The guidelines for abnormalities include follow up CT scans at various intervals which could range from 6 weeks to 1 year intervals. If there is a change for the worse then considerations for a biopsy will be considered if you are a candidate. Second opinion evaluation with thoracic surgeon or an interventional radiologist could be offered, \par \par Problem 4: Snoring  / ?VELIA (risk factors: metabolism, snoring, fatigue, memory) \par - get Home sleep study \par -Sleep apnea is associated with adverse clinical consequences which can affect most organ systems. Cardiovascular disease risk includes arrhythmias, atrial fibrillation, hypertension, coronary artery disease, and stroke. Metabolic disorders include diabetes type 2, non-alcoholic fatty liver disease. Mood disorder especially depression; and cognitive decline especially in the elderly. Associations with chronic reflux/Talley’s esophagus some but not all inclusive. \par -Reasons include arousal consistent with hypopnea; respiratory events most prominent in REM sleep or supine position; therefore sleep staging and body position are important for accurate diagnosis and estimation of AHI.  \par \par Problem 5: Cardiac\par -Recommend cardiac follow up evaluation with cardiologist if needed Dr. Carnes \par \par Problem 6:overweight/out of shape\par -Recommend "Muniq" OTC Supplement for weight loss, energy levels, and blood sugar levels \par - Weight loss, exercise and diet control were discussed and are highly encouraged. Treatment options were given such as aqua therapy, and contacting a nutritionist. Recommended to use the elliptical, stationary bike, less use of treadmill. Mindful eating was explained to the patient. Obesity is associated with worsening asthma, SOB, and potential for cardiac disease, diabetes, and other underlying medical conditions.\par \par Problem 7: Poor mechanics of breathing\par -Recommended Wim Hof and Buteyko breathing techniques \par - Proper breathing techniques were reviewed with an emphasis on exhalation. Patient instructed to breath in for 1 second and out for four seconds. Patient was encouraged not to talk while walking.\par \par Problem 8: Anxiety \par - recommended the book "the gift of maybe" by Brandee Her \par \par \par Problem 9 : Health Maintenance\par -s/p flu shot 2021 \par - s/p COVID-19 vaccine x3 \par -recommended strep pneumonia vaccines: Prevnar-13 vaccine, follow by Pneumo vaccine 23 one year following\par -recommended early intervention for URIs\par -recommended regular osteoporosis evaluations\par -recommended early dermatological evaluations\par -recommended after the age of 50 to the age of 70, colonoscopy every 5 years\par \par f/u in 6-8 weeks\par pt is encouraged to call or fax the office with any questions or concerns.

## 2022-01-10 NOTE — ADDENDUM
[FreeTextEntry1] : Documented by Celia Brandon acting as a scribe for Dr. Rob Gee on (01/10/2022).\par \par All medical record entries made by the Scribe were at my, Dr. Rob Gee's, direction and personally dictated by me on (01/10/2022). I have reviewed the chart and agree that the record accurately reflects my personal performance of the history, physical exam, assessment and plan. I have also personally directed, reviewed, and agree with the discharge instructions.\par

## 2022-01-10 NOTE — HISTORY OF PRESENT ILLNESS
[TextBox_4] : Ms. MARCUS is a 61 year female with a history of who now comes in for an initial pulmonary evaluation. Her chief complaint is\par - she has been feeling SOB lately \par - she recently gained some weight, about 15 lbs but she lost 55 lbs \par - she had bronchitis once in her life, many years ago \par - no construction in or around home \par - no recent changes in medications \par - she has been feeling anxious, dealing with emotional issues / son \par - she notes she sleeps well \par - she notes she snores \par - when she wakes up she feels rested \par - she can fall asleep watching a boring TV show \par - blood sugar has been stable, 124 in the mornings. \par - she is on a low dose \par - she notes she had chicken pox \par - her heart burn has been active lately \par - s/p covid-19 vaccine x3 \par patient denies any headaches, nausea, vomiting, fever, chills, sweats, chest pain, chest pressure, palpitations, coughing, wheezing, fatigue, diarrhea, constipation, dysphagia, myalgias, dizziness, leg swelling, leg pain, itchy eyes, itchy ears. \par

## 2022-01-10 NOTE — PROCEDURE
[FreeTextEntry1] : \par CT (MEY.15.2021) impression: Tiny lung nodules measuring up to 5 mm in the right lower lobe. \par \par Full PFT revealed normal flows, with a FEV1 of 2.74 L, which is 118% of predicted, normal lung volumes, and a diffusion of 20.5 , which is 85 % of predicted, with a normal flow volume loop \par  \par CXR revealed a normal sized heart; there was no evidence of infiltrate or effusion -- A normal appearing chest radiograph \par \par Feno was 23; a normal value being less than 25. Fractional exhaled nitric oxide (FENO) is regarded as a simple, noninvasive method for assessing eosinophilic airway inflammation. Produced by a variety of cells within the lung, nitric oxide (NO) concentrations are generally low in healthy individuals. However, high concentrations of NO appear to be involved in nonspecific host defense mechanisms and chronic inflammatory  diseases such as asthma. The American Thoracic Society (ATS) therefore recommended using FENO to aid in the diagnosis and monitoring of eosinophilic airway inflammation and asthma, and for identifying steroid responsive individuals whose chronic respiratory symptoms may be caused by airway inflammation \par \par 6 minute walk test reveals a low saturation of 95% with slight dyspnea or fatigue; walked 505.3  meters\par \par \par \par

## 2022-01-24 ENCOUNTER — APPOINTMENT (OUTPATIENT)
Dept: ENDOCRINOLOGY | Facility: CLINIC | Age: 62
End: 2022-01-24
Payer: COMMERCIAL

## 2022-01-24 VITALS
SYSTOLIC BLOOD PRESSURE: 143 MMHG | OXYGEN SATURATION: 94 % | WEIGHT: 220 LBS | BODY MASS INDEX: 38.98 KG/M2 | HEART RATE: 82 BPM | DIASTOLIC BLOOD PRESSURE: 84 MMHG | TEMPERATURE: 98.2 F | HEIGHT: 63 IN

## 2022-01-24 VITALS — SYSTOLIC BLOOD PRESSURE: 140 MMHG | DIASTOLIC BLOOD PRESSURE: 78 MMHG

## 2022-01-24 LAB — HBA1C MFR BLD HPLC: 5.9

## 2022-01-24 PROCEDURE — 36415 COLL VENOUS BLD VENIPUNCTURE: CPT

## 2022-01-24 PROCEDURE — 99215 OFFICE O/P EST HI 40 MIN: CPT | Mod: 25

## 2022-01-24 PROCEDURE — 83036 HEMOGLOBIN GLYCOSYLATED A1C: CPT | Mod: QW

## 2022-01-24 RX ORDER — PHENTERMINE HYDROCHLORIDE 15 MG/1
15 CAPSULE ORAL
Qty: 30 | Refills: 3 | Status: ACTIVE | COMMUNITY
Start: 2021-09-13 | End: 1900-01-01

## 2022-01-24 NOTE — PHYSICAL EXAM
[Alert] : alert [No Acute Distress] : no acute distress [Normal Sclera/Conjunctiva] : normal sclera/conjunctiva [EOMI] : extra ocular movement intact [No LAD] : no lymphadenopathy [Thyroid Not Enlarged] : the thyroid was not enlarged [No Respiratory Distress] : no respiratory distress [Clear to Auscultation] : lungs were clear to auscultation bilaterally [Normal S1, S2] : normal S1 and S2 [Regular Rhythm] : with a regular rhythm [No Edema] : no peripheral edema [Pedal Pulses Normal] : the pedal pulses are present [Normal Bowel Sounds] : normal bowel sounds [Not Tender] : non-tender [Not Distended] : not distended [Soft] : abdomen soft [Normal Anterior Cervical Nodes] : no anterior cervical lymphadenopathy [No Clubbing, Cyanosis] : no clubbing  or cyanosis of the fingernails [No Rash] : no rash [Normal] : normal [2+] : 2+ in the dorsalis pedis [Normal Reflexes] : deep tendon reflexes were 2+ and symmetric [Normal Affect] : the affect was normal [Normal Mood] : the mood was normal [Right foot was examined, including] : right foot ~C was examined, including visual inspection with sensory and pulse exams [Left foot was examined, including] : left foot ~C was examined, including visual inspection with sensory and pulse exams [Kyphosis] : no kyphosis present [Diminished Throughout Both Feet] : normal tactile sensation with monofilament testing throughout both feet [FreeTextEntry1] : callus [FreeTextEntry5] : callus

## 2022-01-24 NOTE — ASSESSMENT
[Carbohydrate Consistent Diet] : carbohydrate consistent diet [Long Term Vascular Complications] : long term vascular complications of diabetes [Importance of Diet and Exercise] : importance of diet and exercise to improve glycemic control, achieve weight loss and improve cardiovascular health [Diabetes Foot Care] : diabetes foot care [FreeTextEntry1] : 61 y.o. female with h/o Type 2 DM, hyperlipidemia, hypothyroidism and thyroid nodules.\par \par 1. Type 2 DM- Excellent control with Hba1c of 5.9% today. Encouraged carbohydrate consistent diet and exercise. Will continue Januvia 100 mg daily. Will check CMP and urine microalb/cr ratio. \par \par 2. BP is above goal today. Encouraged low sodium diet, exercise and weight loss. Will have repeat BP check with her PCP. \par \par 3. Hyperlipidemia- Will check lipids and will continue statin.\par \par 4. Hypothyroidism- Patient appears euthyroid and will continue LT4 88 mcg daily for now. Will check TFTs. \par \par 5. Thyroid nodules- Will monitor for now and repeat thyroid ultrasound in 2022\par \par 6. Vitamin D def/Primary hyperparathyroidism- Will check serum calcium and phosphorus with intact PTH and 25 vitamin D level. Will remain off supplement. May need to consider surgery given hypercalcemia\par \par 7. Obesity- Encouraged exercise and portion control. Intolerant to Metformin and GLP-1 analog. Would like to retry phentermine but start at lower dose of 15 mg daily to limit anxiety. Not interested in bariatric surgery. Did see medical weight management program. \par \par 8. Osteopenia- Average risk of fracture. Will monitor for now. Recommend repeat DEXA scan in 2022. Encouraged weight bearing activity. Normal 25 vitamin D level. \par \par Follow up in 3-4 months\par Follow up with therapist and PCP\par Labs drawn in the office today

## 2022-01-24 NOTE — REVIEW OF SYSTEMS
[Joint Pain] : joint pain [Anxiety] : anxiety [Negative] : Endocrine [Recent Weight Gain (___ Lbs)] : recent weight gain: [unfilled] lbs [Fatigue] : no fatigue [Recent Weight Loss (___ Lbs)] : no recent weight loss [Dysphagia] : no dysphagia [Dysphonia] : no dysphonia [Polyuria] : no polyuria [Hair Loss] : no hair loss [Pain/Numbness of Digits] : no pain/numbness of digits [Swelling] : no swelling

## 2022-01-24 NOTE — HISTORY OF PRESENT ILLNESS
[___] : [unfilled] [FreeTextEntry1] : 61 y.o. female with h/o Type 2 DM, hypothyroidism, hyperlipidemia and osteopenia here for follow up visit. Had appendectomy on 12/26/2020. No acute events since last visit. Reports feeling stressed with her son's anxiety. Planning to see therapist. \par \par No fatigue. Was doing well with diet called Optavia but now off it given GI symptoms. Weight is up since last visit because of increase in snacking. Does have sweets. Taking Januvia 100 mg daily since April 2020. Off Jardiance 25 mg daily because of yeast infection. Intolerant to Metformin and Trulicity in the past. Stopped Phentermine. Not using Freestyle Addison now. Monitoring FS on occasion.  No hypoglycemia. UTD with opthalmology  and no retinopathy. No neuropathy. Follows with podiatry. No proteinuria. Following with GI for fatty liver. No exercise. Knee pain is better now, saw orthopedics. Completed PT. C/o right hip pain on and off.\par \par Hyperlipidemia- taking Atorvastatin 20 mg daily\par \par Regarding thyroid disease, no neck complaints. Taking LT4 88 mcg daily. Thyroid sonogram in January 2019 showed stable b/l subcentimeter thyroid nodules with largest in the left lower pole which is 1.2 cm. Thyroid sonogram on 7/8/2020 shows mild increase in size of left lower pole nodule to 1.6 cm. Left mid pole nodule is stable at 0.6 cm. There is a new right 0.7 cm hypoechoic nodule. Thyroid sonogram on 4/19/21 showed stable right 0.8 cm nodule, stable left mid pole 0.7 cm nodule and mild increase in left lower pole nodule to 1.8 cm.  C/o raspy voice at times. \par \par Also h/o hypercalcemia most likely primary hyperparathyroidism. No h/o kidney stones. No polyuria and polydipsia. Had DEXA scan in March 2017 with GYN which showed spine -0.1 and femoral neck -0.7. DEXA in March 2018 showed femoral neck -1.8, total hip -1.0, 1/3 radius -0.8 and spine -0.5. DEXA scan in 9/2020 showed 1/3 radius -1.2 with 4.1% decrease, spine -0.2 which is stable and left femoral neck -1.1 and total hip -0.4 which are stable. No falls or fractures. \par \par Saw dermatology for hair loss. Hair loss is androgenic in origin. Hair loss has resolved and taking MVI.  [Hypoglycemia] : not hypoglycemic

## 2022-01-25 LAB
25(OH)D3 SERPL-MCNC: 33.5 NG/ML
ALBUMIN SERPL ELPH-MCNC: 5 G/DL
ALP BLD-CCNC: 68 U/L
ALT SERPL-CCNC: 26 U/L
ANION GAP SERPL CALC-SCNC: 15 MMOL/L
AST SERPL-CCNC: 17 U/L
BASOPHILS # BLD AUTO: 0.03 K/UL
BASOPHILS NFR BLD AUTO: 0.4 %
BILIRUB SERPL-MCNC: 0.3 MG/DL
BUN SERPL-MCNC: 21 MG/DL
CALCIUM SERPL-MCNC: 10.4 MG/DL
CALCIUM SERPL-MCNC: 10.4 MG/DL
CHLORIDE SERPL-SCNC: 101 MMOL/L
CHOLEST SERPL-MCNC: 201 MG/DL
CO2 SERPL-SCNC: 22 MMOL/L
CREAT SERPL-MCNC: 0.74 MG/DL
CREAT SPEC-SCNC: 54 MG/DL
EOSINOPHIL # BLD AUTO: 0.14 K/UL
EOSINOPHIL NFR BLD AUTO: 1.9 %
GLUCOSE SERPL-MCNC: 141 MG/DL
HCT VFR BLD CALC: 44 %
HDLC SERPL-MCNC: 58 MG/DL
HGB BLD-MCNC: 14 G/DL
IMM GRANULOCYTES NFR BLD AUTO: 0.3 %
LDLC SERPL CALC-MCNC: 100 MG/DL
LDLC SERPL DIRECT ASSAY-MCNC: 110 MG/DL
LYMPHOCYTES # BLD AUTO: 2.05 K/UL
LYMPHOCYTES NFR BLD AUTO: 27.8 %
MAN DIFF?: NORMAL
MCHC RBC-ENTMCNC: 28.6 PG
MCHC RBC-ENTMCNC: 31.8 GM/DL
MCV RBC AUTO: 89.8 FL
MICROALBUMIN 24H UR DL<=1MG/L-MCNC: <1.2 MG/DL
MICROALBUMIN/CREAT 24H UR-RTO: NORMAL MG/G
MONOCYTES # BLD AUTO: 0.53 K/UL
MONOCYTES NFR BLD AUTO: 7.2 %
NEUTROPHILS # BLD AUTO: 4.61 K/UL
NEUTROPHILS NFR BLD AUTO: 62.4 %
NONHDLC SERPL-MCNC: 143 MG/DL
PARATHYROID HORMONE INTACT: 58 PG/ML
PHOSPHATE SERPL-MCNC: 3.3 MG/DL
PLATELET # BLD AUTO: 216 K/UL
POTASSIUM SERPL-SCNC: 4.6 MMOL/L
PROT SERPL-MCNC: 7.2 G/DL
RBC # BLD: 4.9 M/UL
RBC # FLD: 13.3 %
SODIUM SERPL-SCNC: 137 MMOL/L
T4 FREE SERPL-MCNC: 1.5 NG/DL
TRIGL SERPL-MCNC: 214 MG/DL
TSH SERPL-ACNC: 0.74 UIU/ML
WBC # FLD AUTO: 7.38 K/UL

## 2022-01-31 ENCOUNTER — NON-APPOINTMENT (OUTPATIENT)
Age: 62
End: 2022-01-31

## 2022-01-31 DIAGNOSIS — G47.33 OBSTRUCTIVE SLEEP APNEA (ADULT) (PEDIATRIC): ICD-10-CM

## 2022-02-07 ENCOUNTER — TRANSCRIPTION ENCOUNTER (OUTPATIENT)
Age: 62
End: 2022-02-07

## 2022-03-03 NOTE — ED PROVIDER NOTE - NS ED ATTENDING STATEMENT MOD
[facility]    F/u 8/1/22 I have personally performed a face to face diagnostic evaluation on this patient. I have reviewed the ACP note and agree with the history, exam and plan of care, except as noted.

## 2022-03-07 ENCOUNTER — APPOINTMENT (OUTPATIENT)
Dept: PULMONOLOGY | Facility: CLINIC | Age: 62
End: 2022-03-07

## 2022-04-25 RX ORDER — SITAGLIPTIN 100 MG/1
100 TABLET, FILM COATED ORAL
Qty: 90 | Refills: 3 | Status: ACTIVE | COMMUNITY
Start: 2019-06-26 | End: 1900-01-01

## 2022-05-23 ENCOUNTER — APPOINTMENT (OUTPATIENT)
Dept: ENDOCRINOLOGY | Facility: CLINIC | Age: 62
End: 2022-05-23
Payer: COMMERCIAL

## 2022-05-23 VITALS
SYSTOLIC BLOOD PRESSURE: 124 MMHG | TEMPERATURE: 97.9 F | BODY MASS INDEX: 39.51 KG/M2 | OXYGEN SATURATION: 96 % | HEART RATE: 80 BPM | WEIGHT: 223 LBS | DIASTOLIC BLOOD PRESSURE: 76 MMHG | HEIGHT: 63 IN

## 2022-05-23 LAB — HBA1C MFR BLD HPLC: 6.4

## 2022-05-23 PROCEDURE — 83036 HEMOGLOBIN GLYCOSYLATED A1C: CPT | Mod: QW

## 2022-05-23 PROCEDURE — 36415 COLL VENOUS BLD VENIPUNCTURE: CPT

## 2022-05-23 PROCEDURE — 99215 OFFICE O/P EST HI 40 MIN: CPT | Mod: 25

## 2022-05-23 NOTE — HISTORY OF PRESENT ILLNESS
[FreeTextEntry1] : 62 y.o. female with h/o Type 2 DM diagnosed in 2014, hypothyroidism, hyperlipidemia, thyroid nodules and osteopenia here for follow up visit. Had appendectomy on 12/26/2020. No acute events since last visit. Reports feeling better since last visit. \par \par No fatigue. Was doing well with diet called Optavia but was off it given GI symptoms. Now restarted Optavia diet. Weight is up since last visit because of increase in snacking. Does have sweets. Taking Januvia 100 mg daily since April 2020. Off Jardiance 25 mg daily because of yeast infection. Intolerant to Metformin and Trulicity in the past. Stopped Phentermine. Not using Freestyle Addison now. Monitoring FS daily and in the mornings are 120s to 140s.  No hypoglycemia. UTD with opthalmology and no retinopathy. No neuropathy. Follows with podiatry. No proteinuria. Following with GI for fatty liver. No exercise. Knee pain is better now, saw orthopedics. Completed PT. C/o right hip pain on and off.\par \par Hyperlipidemia- taking Atorvastatin 20 mg daily\par \par Regarding thyroid disease, no neck complaints. Taking LT4 88 mcg daily. Thyroid sonogram in January 2019 showed stable b/l subcentimeter thyroid nodules with largest in the left lower pole which is 1.2 cm. Thyroid sonogram on 7/8/2020 shows mild increase in size of left lower pole nodule to 1.6 cm. Left mid pole nodule is stable at 0.6 cm. There is a new right 0.7 cm hypoechoic nodule. Thyroid sonogram on 4/19/21 showed stable right 0.8 cm nodule, stable left mid pole 0.7 cm nodule and mild increase in left lower pole nodule to 1.8 cm.  C/o raspy voice at times. \par \par Also h/o hypercalcemia most likely primary hyperparathyroidism and osteopenia. No h/o kidney stones. No polyuria and polydipsia. Had DEXA scan in March 2017 with GYN which showed spine -0.1 and femoral neck -0.7. DEXA in March 2018 showed femoral neck -1.8, total hip -1.0, 1/3 radius -0.8 and spine -0.5. DEXA scan in 9/2020 showed 1/3 radius -1.2 with 4.1% decrease, spine -0.2 which is stable and left femoral neck -1.1 and total hip -0.4 which are stable. No falls or fractures. \par \par Saw dermatology for hair loss. Hair loss is androgenic in origin. Hair loss has resolved and taking MVI.  [Hypoglycemia] : not hypoglycemic

## 2022-05-23 NOTE — ASSESSMENT
[Carbohydrate Consistent Diet] : carbohydrate consistent diet [Diabetes Foot Care] : diabetes foot care [Long Term Vascular Complications] : long term vascular complications of diabetes [Importance of Diet and Exercise] : importance of diet and exercise to improve glycemic control, achieve weight loss and improve cardiovascular health [FreeTextEntry1] : 62 y.o. female with h/o Type 2 DM, hyperlipidemia, hypothyroidism and thyroid nodules.\par \par 1. Type 2 DM- Good control with Hba1c of 6.4% today. Encouraged a carbohydrate consistent diet and exercise. Will continue Januvia 100 mg daily. Will check CMP and urine alb/cr ratio. \par \par 2. BP is at goal today. Encouraged low sodium diet, exercise and weight loss.  \par \par 3. Hyperlipidemia- Will check lipids and will continue Atorvastatin 20 mg daily.\par \par 4. Hypothyroidism- Patient appears euthyroid and will continue LT4 88 mcg daily for now. Will check TFTs. \par \par 5. Thyroid nodules- Will monitor for now and repeat thyroid ultrasound now. \par \par 6. Vitamin D def/Primary hyperparathyroidism- Will check serum calcium and phosphorus with intact PTH and 25 vitamin D level. Will remain off supplement. May need to consider surgery given hypercalcemia\par \par 7. Obesity- Encouraged exercise and portion control. Intolerant to Metformin and GLP-1 agonists. Not interested in bariatric surgery. Did see medical weight management program. \par \par 8. Osteopenia- Average risk of fracture. Will monitor for now. Recommend repeat DEXA scan in 9/2022. Encouraged weight bearing activity. Will check 25 vitamin D level. \par \par Follow up in 3-4 months\par Labs drawn in the office today

## 2022-05-23 NOTE — REVIEW OF SYSTEMS
[Recent Weight Gain (___ Lbs)] : recent weight gain: [unfilled] lbs [Joint Pain] : joint pain [Anxiety] : anxiety [Negative] : Endocrine [Fatigue] : no fatigue [Recent Weight Loss (___ Lbs)] : no recent weight loss [Dysphagia] : no dysphagia [Dysphonia] : no dysphonia [Polyuria] : no polyuria [Hair Loss] : no hair loss [Pain/Numbness of Digits] : no pain/numbness of digits [Swelling] : no swelling

## 2022-05-23 NOTE — PHYSICAL EXAM
[Alert] : alert [No Acute Distress] : no acute distress [Normal Sclera/Conjunctiva] : normal sclera/conjunctiva [EOMI] : extra ocular movement intact [No LAD] : no lymphadenopathy [Thyroid Not Enlarged] : the thyroid was not enlarged [No Respiratory Distress] : no respiratory distress [Clear to Auscultation] : lungs were clear to auscultation bilaterally [Normal S1, S2] : normal S1 and S2 [Regular Rhythm] : with a regular rhythm [No Edema] : no peripheral edema [Pedal Pulses Normal] : the pedal pulses are present [Normal Bowel Sounds] : normal bowel sounds [Not Tender] : non-tender [Not Distended] : not distended [Soft] : abdomen soft [Normal Anterior Cervical Nodes] : no anterior cervical lymphadenopathy [No Clubbing, Cyanosis] : no clubbing  or cyanosis of the fingernails [No Rash] : no rash [Right foot was examined, including] : right foot ~C was examined, including visual inspection with sensory and pulse exams [Left foot was examined, including] : left foot ~C was examined, including visual inspection with sensory and pulse exams [Normal] : normal [2+] : 2+ in the dorsalis pedis [Normal Reflexes] : deep tendon reflexes were 2+ and symmetric [Normal Affect] : the affect was normal [Normal Mood] : the mood was normal [Kyphosis] : no kyphosis present [Diminished Throughout Both Feet] : normal tactile sensation with monofilament testing throughout both feet [FreeTextEntry1] : callus [FreeTextEntry5] : callus

## 2022-05-24 LAB
25(OH)D3 SERPL-MCNC: 32.9 NG/ML
ALBUMIN SERPL ELPH-MCNC: 4.9 G/DL
ALP BLD-CCNC: 74 U/L
ALT SERPL-CCNC: 32 U/L
ANION GAP SERPL CALC-SCNC: 11 MMOL/L
AST SERPL-CCNC: 7 U/L
BASOPHILS # BLD AUTO: 0.04 K/UL
BASOPHILS NFR BLD AUTO: 0.5 %
BILIRUB SERPL-MCNC: 0.4 MG/DL
BUN SERPL-MCNC: 22 MG/DL
CALCIUM SERPL-MCNC: 10.4 MG/DL
CALCIUM SERPL-MCNC: 10.4 MG/DL
CHLORIDE SERPL-SCNC: 100 MMOL/L
CHOLEST SERPL-MCNC: 177 MG/DL
CO2 SERPL-SCNC: 25 MMOL/L
CREAT SERPL-MCNC: 0.77 MG/DL
CREAT SPEC-SCNC: 150 MG/DL
EGFR: 87 ML/MIN/1.73M2
EOSINOPHIL # BLD AUTO: 0.25 K/UL
EOSINOPHIL NFR BLD AUTO: 3 %
GLUCOSE SERPL-MCNC: 188 MG/DL
HCT VFR BLD CALC: 46.9 %
HDLC SERPL-MCNC: 56 MG/DL
HGB BLD-MCNC: 14.4 G/DL
IMM GRANULOCYTES NFR BLD AUTO: 0.2 %
LDLC SERPL CALC-MCNC: 87 MG/DL
LYMPHOCYTES # BLD AUTO: 2.01 K/UL
LYMPHOCYTES NFR BLD AUTO: 24.4 %
MAN DIFF?: NORMAL
MCHC RBC-ENTMCNC: 28.8 PG
MCHC RBC-ENTMCNC: 30.7 GM/DL
MCV RBC AUTO: 93.8 FL
MICROALBUMIN 24H UR DL<=1MG/L-MCNC: <1.2 MG/DL
MICROALBUMIN/CREAT 24H UR-RTO: NORMAL MG/G
MONOCYTES # BLD AUTO: 0.67 K/UL
MONOCYTES NFR BLD AUTO: 8.1 %
NEUTROPHILS # BLD AUTO: 5.26 K/UL
NEUTROPHILS NFR BLD AUTO: 63.8 %
NONHDLC SERPL-MCNC: 121 MG/DL
PARATHYROID HORMONE INTACT: 68 PG/ML
PHOSPHATE SERPL-MCNC: 3.4 MG/DL
PLATELET # BLD AUTO: 229 K/UL
POTASSIUM SERPL-SCNC: 5.8 MMOL/L
PROT SERPL-MCNC: 7.4 G/DL
RBC # BLD: 5 M/UL
RBC # FLD: 13.8 %
SODIUM SERPL-SCNC: 136 MMOL/L
T4 FREE SERPL-MCNC: 1.5 NG/DL
TRIGL SERPL-MCNC: 171 MG/DL
TSH SERPL-ACNC: 0.61 UIU/ML
WBC # FLD AUTO: 8.25 K/UL

## 2022-11-01 ENCOUNTER — APPOINTMENT (OUTPATIENT)
Dept: ENDOCRINOLOGY | Facility: CLINIC | Age: 62
End: 2022-11-01

## 2022-11-01 VITALS
RESPIRATION RATE: 16 BRPM | TEMPERATURE: 98 F | DIASTOLIC BLOOD PRESSURE: 81 MMHG | BODY MASS INDEX: 40.22 KG/M2 | OXYGEN SATURATION: 98 % | SYSTOLIC BLOOD PRESSURE: 128 MMHG | HEIGHT: 63 IN | HEART RATE: 77 BPM | WEIGHT: 227 LBS

## 2022-11-01 LAB — HBA1C MFR BLD HPLC: 7.6

## 2022-11-01 PROCEDURE — 36415 COLL VENOUS BLD VENIPUNCTURE: CPT

## 2022-11-01 PROCEDURE — 83036 HEMOGLOBIN GLYCOSYLATED A1C: CPT | Mod: QW

## 2022-11-01 PROCEDURE — 99215 OFFICE O/P EST HI 40 MIN: CPT | Mod: 25

## 2022-11-01 RX ORDER — BLOOD SUGAR DIAGNOSTIC
STRIP MISCELLANEOUS
Qty: 200 | Refills: 3 | Status: ACTIVE | COMMUNITY
Start: 2020-06-03 | End: 1900-01-01

## 2022-11-01 RX ORDER — AZITHROMYCIN 250 MG/1
250 TABLET, FILM COATED ORAL
Qty: 6 | Refills: 0 | Status: DISCONTINUED | COMMUNITY
Start: 2022-08-16

## 2022-11-01 NOTE — HISTORY OF PRESENT ILLNESS
[FreeTextEntry1] : 62 y.o. female with h/o Type 2 DM diagnosed in 2014, hypothyroidism, hyperlipidemia, thyroid nodules and osteopenia here for follow up visit. Had appendectomy on 12/26/2020. No acute events since last visit. Reports feeling stressed since last visit. Her mom had a syncopal episode and suffered a head laceration. Also reports stress with her son. She did speak with a therapist briefly. \par \par No fatigue. Was doing well with diet called Optavia but was off it given GI symptoms. Now restarted Optavia diet. Weight is up since last visit because of increase in snacking and stress eating. Does have sweets. Taking Januvia 100 mg daily since April 2020. Off Jardiance 25 mg daily because of yeast infection. Intolerant to Metformin and Trulicity in the past. Stopped Phentermine. Not using Freestyle Addison now. Monitoring FS 4 times per day and in the mornings are 149 to 174.  No hypoglycemia. UTD with opthalmology and no retinopathy. No neuropathy. Follows with podiatry. No proteinuria. Following with GI for fatty liver. No exercise. Knee pain is better now, saw orthopedics. Completed PT. C/o right hip pain on and off. \par \par Hyperlipidemia- taking Atorvastatin 20 mg daily\par \par Regarding thyroid disease, no neck complaints. Taking LT4 88 mcg daily. Thyroid sonogram in January 2019 showed stable b/l subcentimeter thyroid nodules with largest in the left lower pole which is 1.2 cm. Thyroid sonogram on 7/8/2020 shows mild increase in size of left lower pole nodule to 1.6 cm. Left mid pole nodule is stable at 0.6 cm. There is a new right 0.7 cm hypoechoic nodule. Thyroid sonogram on 4/19/21 showed stable right 0.8 cm nodule, stable left mid pole 0.7 cm nodule and mild increase in left lower pole nodule to 1.8 cm.  Thyroid sonogram on 9/6/22 showed stable right 0.8 cm hypoechoic  nodule and stable left 0.6 cm hypoechoic mid pole nodule and stable left 1.9 cm lower pole nodule. C/o raspy voice at times. \par \par Also h/o hypercalcemia most likely primary hyperparathyroidism and osteopenia. No h/o kidney stones. No polyuria and polydipsia. Had DEXA scan in March 2017 with GYN which showed spine -0.1 and femoral neck -0.7. DEXA in March 2018 showed femoral neck -1.8, total hip -1.0, 1/3 radius -0.8 and spine -0.5. DEXA scan in 9/2020 showed 1/3 radius -1.2 with 4.1% decrease, spine -0.2 which is stable and left femoral neck -1.1 and total hip -0.4 which are stable. No falls or fractures. \par \par Saw dermatology for hair loss. Hair loss is androgenic in origin. Hair loss has resolved and taking MVI.  [Hypoglycemia] : not hypoglycemic

## 2022-11-01 NOTE — ADDENDUM
[FreeTextEntry1] : Patient monitors FS 4 times per day and has fluctuating blood glucose levels with suboptimal control. CGMS is a medical necessity.

## 2022-11-01 NOTE — ASSESSMENT
[Carbohydrate Consistent Diet] : carbohydrate consistent diet [Diabetes Foot Care] : diabetes foot care [Long Term Vascular Complications] : long term vascular complications of diabetes [Importance of Diet and Exercise] : importance of diet and exercise to improve glycemic control, achieve weight loss and improve cardiovascular health [FreeTextEntry1] : 62 y.o. female with h/o Type 2 DM, hyperlipidemia, hypothyroidism and thyroid nodules.\par \par 1. Type 2 DM- Suboptimal control with Hba1c of 7.6% today. Encouraged a carbohydrate consistent diet and exercise. Will discontinue Januvia 100 mg daily and start Mounjaro 2.5 mg SQ weekly. Reviewed risks and benefits of Mounjaro including GI side effects, pancreatitis and MTC. She will contact the office in 4 weeks and if tolerating will titrate up to 5 mg SQ weekly. She met with our CDE for a pen teach. Will check CMP and urine alb/cr ratio. \par \par 2. BP is at goal today. Encouraged low sodium diet, exercise and weight loss.  \par \par 3. Hyperlipidemia- Will check lipids and will continue Atorvastatin 20 mg daily.\par \par 4. Hypothyroidism- Patient appears euthyroid and will continue LT4 88 mcg daily for now. Will check TFTs and antithyroid antibodies. \par \par 5. Thyroid nodules- Given nodule stability, will monitor for now and repeat thyroid ultrasound in 2023. \par \par 6. Vitamin D def/Primary hyperparathyroidism- Will check serum calcium and phosphorus with intact PTH and 25 vitamin D level. Will remain off supplement. May need to consider surgery given hypercalcemia\par \par 7. Obesity- Encouraged exercise and portion control. Intolerant to Metformin and GLP-1 agonists. Not interested in bariatric surgery. Did see medical weight management program. Will start trial of Mounjaro 2.5 mg SQ weekly. \par \par 8. Osteopenia- Average risk of fracture. Will monitor for now. Recommend repeat DEXA scan now. Encouraged weight bearing activity. Will check 25 vitamin D level. \par \par Follow up in 3-4 months\par Labs drawn in the office today

## 2022-11-01 NOTE — REVIEW OF SYSTEMS
[Recent Weight Gain (___ Lbs)] : recent weight gain: [unfilled] lbs [Joint Pain] : joint pain [Anxiety] : anxiety [Negative] : Endocrine [Fatigue] : no fatigue [Recent Weight Loss (___ Lbs)] : no recent weight loss [Dysphagia] : no dysphagia [Dysphonia] : no dysphonia [Polyuria] : no polyuria [Hair Loss] : no hair loss [Pain/Numbness of Digits] : no pain/numbness of digits [Stress] : stress [Swelling] : no swelling

## 2022-11-02 LAB
25(OH)D3 SERPL-MCNC: 34.1 NG/ML
ALBUMIN SERPL ELPH-MCNC: 4.9 G/DL
ALP BLD-CCNC: 87 U/L
ALT SERPL-CCNC: 65 U/L
ANION GAP SERPL CALC-SCNC: 12 MMOL/L
AST SERPL-CCNC: 35 U/L
BASOPHILS # BLD AUTO: 0.04 K/UL
BASOPHILS NFR BLD AUTO: 0.5 %
BILIRUB SERPL-MCNC: 0.4 MG/DL
BUN SERPL-MCNC: 16 MG/DL
CALCIUM SERPL-MCNC: 10.3 MG/DL
CALCIUM SERPL-MCNC: 10.3 MG/DL
CHLORIDE SERPL-SCNC: 102 MMOL/L
CHOLEST SERPL-MCNC: 186 MG/DL
CO2 SERPL-SCNC: 25 MMOL/L
CREAT SERPL-MCNC: 0.74 MG/DL
CREAT SPEC-SCNC: 87 MG/DL
EGFR: 91 ML/MIN/1.73M2
EOSINOPHIL # BLD AUTO: 0.19 K/UL
EOSINOPHIL NFR BLD AUTO: 2.2 %
FERRITIN SERPL-MCNC: 329 NG/ML
GLUCOSE SERPL-MCNC: 149 MG/DL
HCT VFR BLD CALC: 43.8 %
HDLC SERPL-MCNC: 50 MG/DL
HGB BLD-MCNC: 14.1 G/DL
IMM GRANULOCYTES NFR BLD AUTO: 0.2 %
IRON SATN MFR SERPL: 28 %
IRON SERPL-MCNC: 81 UG/DL
LDLC SERPL CALC-MCNC: 93 MG/DL
LYMPHOCYTES # BLD AUTO: 2.5 K/UL
LYMPHOCYTES NFR BLD AUTO: 28.6 %
MAN DIFF?: NORMAL
MCHC RBC-ENTMCNC: 29.4 PG
MCHC RBC-ENTMCNC: 32.2 GM/DL
MCV RBC AUTO: 91.3 FL
MICROALBUMIN 24H UR DL<=1MG/L-MCNC: <1.2 MG/DL
MICROALBUMIN/CREAT 24H UR-RTO: NORMAL MG/G
MONOCYTES # BLD AUTO: 0.57 K/UL
MONOCYTES NFR BLD AUTO: 6.5 %
NEUTROPHILS # BLD AUTO: 5.41 K/UL
NEUTROPHILS NFR BLD AUTO: 62 %
NONHDLC SERPL-MCNC: 136 MG/DL
PARATHYROID HORMONE INTACT: 83 PG/ML
PHOSPHATE SERPL-MCNC: 3.2 MG/DL
PLATELET # BLD AUTO: 228 K/UL
POTASSIUM SERPL-SCNC: 5 MMOL/L
PROT SERPL-MCNC: 7.2 G/DL
RBC # BLD: 4.8 M/UL
RBC # FLD: 12.9 %
SODIUM SERPL-SCNC: 138 MMOL/L
T3 SERPL-MCNC: 120 NG/DL
T4 FREE SERPL-MCNC: 1.4 NG/DL
THYROGLOB AB SERPL-ACNC: <20 IU/ML
THYROPEROXIDASE AB SERPL IA-ACNC: <10 IU/ML
TIBC SERPL-MCNC: 290 UG/DL
TRIGL SERPL-MCNC: 219 MG/DL
TSH SERPL-ACNC: 0.67 UIU/ML
UIBC SERPL-MCNC: 209 UG/DL
WBC # FLD AUTO: 8.73 K/UL

## 2022-11-22 ENCOUNTER — NON-APPOINTMENT (OUTPATIENT)
Age: 62
End: 2022-11-22

## 2023-01-23 ENCOUNTER — NON-APPOINTMENT (OUTPATIENT)
Age: 63
End: 2023-01-23

## 2023-02-06 ENCOUNTER — APPOINTMENT (OUTPATIENT)
Dept: PULMONOLOGY | Facility: CLINIC | Age: 63
End: 2023-02-06
Payer: COMMERCIAL

## 2023-02-06 VITALS
RESPIRATION RATE: 16 BRPM | DIASTOLIC BLOOD PRESSURE: 80 MMHG | SYSTOLIC BLOOD PRESSURE: 122 MMHG | BODY MASS INDEX: 38.5 KG/M2 | WEIGHT: 220 LBS | OXYGEN SATURATION: 98 % | HEIGHT: 63.5 IN | TEMPERATURE: 97.3 F | HEART RATE: 86 BPM

## 2023-02-06 DIAGNOSIS — K21.9 GASTRO-ESOPHAGEAL REFLUX DISEASE W/OUT ESOPHAGITIS: ICD-10-CM

## 2023-02-06 DIAGNOSIS — J45.909 UNSPECIFIED ASTHMA, UNCOMPLICATED: ICD-10-CM

## 2023-02-06 DIAGNOSIS — R93.89 ABNORMAL FINDINGS ON DIAGNOSTIC IMAGING OF OTHER SPECIFIED BODY STRUCTURES: ICD-10-CM

## 2023-02-06 DIAGNOSIS — R06.02 SHORTNESS OF BREATH: ICD-10-CM

## 2023-02-06 PROCEDURE — 94727 GAS DIL/WSHOT DETER LNG VOL: CPT

## 2023-02-06 PROCEDURE — 94729 DIFFUSING CAPACITY: CPT

## 2023-02-06 PROCEDURE — 94010 BREATHING CAPACITY TEST: CPT

## 2023-02-06 PROCEDURE — 99214 OFFICE O/P EST MOD 30 MIN: CPT | Mod: 25

## 2023-02-06 PROCEDURE — 95012 NITRIC OXIDE EXP GAS DETER: CPT

## 2023-02-06 NOTE — PROCEDURE
[FreeTextEntry1] : FENO: 14 ppb\par \par PFT'S performed in office show minimal obstructive lung defect. Lung volumes is within normal limits. Mild decrease in diffusing capacity. \par FEV: 111\par FEV1/FVC Ratio: 82\par KXX10-67%: 114\par DLCO: 64\par

## 2023-02-06 NOTE — PROCEDURE
[FreeTextEntry1] : FENO: 14 ppb\par \par PFT'S performed in office show minimal obstructive lung defect. Lung volumes is within normal limits. Mild decrease in diffusing capacity. \par FEV: 111\par FEV1/FVC Ratio: 82\par NHR88-63%: 114\par DLCO: 64\par

## 2023-02-08 NOTE — ADDENDUM
[FreeTextEntry1] : Documented by Garret Peace acting as a scribe for Dr. Rob Gee on (02/06/2023).\par \par All medical record entries made by the Scribe were at my, Dr. Rob Gee's, direction and personally dictated by me on (02/06/2023). I have reviewed the chart and agree that the record accurately reflects my personal performance of the history, physical exam, assessment and plan. I have personally directed, reviewed, and agree with the discharge instructions.

## 2023-02-08 NOTE — REASON FOR VISIT
[Follow-Up] : a follow-up visit [Asthma] : asthma [Shortness of Breath] : shortness of breath [TextBox_44] : SOB, abnormal CT, GERD, ?VELIA , mild asthma

## 2023-02-08 NOTE — HISTORY OF PRESENT ILLNESS
[TextBox_4] : Ms. MARCUS is a 62 year female with a history of who now comes in for a follow up pulmonary evaluation. Her chief complaint is\par \par \par - She  denies any headaches, nausea, vomiting, fever, chills, sweats, chest pains, chest pressure, diarrhea, constipation, dysphagia, myalgia, dizziness, leg swelling, leg pain, itchy eyes, itchy ears, heartburn, reflux, or sour taste in the mouth.

## 2023-02-08 NOTE — ASSESSMENT
[FreeTextEntry1] : Ms. MARCUS is a 62 y.o  female with a history of  nonsmoker, DM, primary hyperparathyroid, osteopenia, thyroid nodules, HLD,  who presents into the office for a follow up pulmonary evaluation for SOB, abnormal CT, GERD, snoring ?OSAS \par \par The patient's SOB is felt to be multifactorial:\par -poor mechanics of breathing\par -out of shape/overweight\par -Pulmonary\par    - abnormal CT \par    - mild asthma \par -Cardiac\par \par Problem 1: Mild Asthma \par - add Ventolin 2 puffs Q6H, pre-exercise \par - Asthma is believed to be caused by inherited (genetic) and environmental factor, but its exact cause is unknown. asthma may be triggered by allergens, lung infections, or irritants in the air. Asthma triggers are different for each person\par - Inhaler technique reviewed as well as oral hygiene technique reviewed with patient. Avoidance of cold air, extremes of temperature, rescue inhaler should be used before exercise. Order of medication reviewed with patient. Recommended use of a cool mist humidifier in the bedroom. \par \par \par Problem 2: GERD\par -add Pepcid 40 mg QHS\par -Rule of 2s: avoid eating too much, eating too late, eating too spicy, eating two hours before bed.\par - Things to avoid including overeating, spicy foods, tight clothing, eating within two hours of bed, this list is not all inclusive.\par - For treatments of reflux, possible options discussed including diet control, H2 blockers, PPIs, as well as coating motility agents discussed as treatment options. Timing of meals and proximity of last meal to sleep were discussed. If symptoms persist, a formal gastrointestinal evaluation is needed.\par \par \par Problem 3: Abnormal CT -  2 Nodules ?post inflammatory in nature due to chickenpox / measles as child\par - get f/p CT of chest noncontrast \par - CAT scans are the only radiological modality to identify abnormality to identify abnormalities w/in the lings with regards to nodules/masses/lymph nodes. Risks, benefits were reviewed in detail. The guidelines for abnormalities include follow up CT scans at various intervals which could range from 6 weeks to 1 year intervals. If there is a change for the worse then considerations for a biopsy will be considered if you are a candidate. Second opinion evaluation with thoracic surgeon or an interventional radiologist could be offered, \par \par Problem 4: Snoring  / ?VELIA (risk factors: metabolism, snoring, fatigue, memory) \par - get Home sleep study \par -Sleep apnea is associated with adverse clinical consequences which can affect most organ systems. Cardiovascular disease risk includes arrhythmias, atrial fibrillation, hypertension, coronary artery disease, and stroke. Metabolic disorders include diabetes type 2, non-alcoholic fatty liver disease. Mood disorder especially depression; and cognitive decline especially in the elderly. Associations with chronic reflux/Talley’s esophagus some but not all inclusive. \par -Reasons include arousal consistent with hypopnea; respiratory events most prominent in REM sleep or supine position; therefore sleep staging and body position are important for accurate diagnosis and estimation of AHI.  \par \par Problem 5: Cardiac\par -Recommend cardiac follow up evaluation with cardiologist if needed Dr. Carnes \par \par Problem 6:overweight/out of shape\par -Recommend "Muniq" OTC Supplement for weight loss, energy levels, and blood sugar levels \par - Weight loss, exercise and diet control were discussed and are highly encouraged. Treatment options were given such as aqua therapy, and contacting a nutritionist. Recommended to use the elliptical, stationary bike, less use of treadmill. Mindful eating was explained to the patient. Obesity is associated with worsening asthma, SOB, and potential for cardiac disease, diabetes, and other underlying medical conditions.\par \par Problem 7: Poor mechanics of breathing\par -Recommended Wim Hof and Buteyko breathing techniques \par - Proper breathing techniques were reviewed with an emphasis on exhalation. Patient instructed to breath in for 1 second and out for four seconds. Patient was encouraged not to talk while walking.\par \par Problem 8: Anxiety \par - recommended the book "the gift of maybe" by Brandee Her \par \par \par Problem 9 : Health Maintenance\par -s/p flu shot 2021 \par - s/p COVID-19 vaccine x3 \par -recommended strep pneumonia vaccines: Prevnar-13 vaccine, follow by Pneumo vaccine 23 one year following\par -recommended early intervention for URIs\par -recommended regular osteoporosis evaluations\par -recommended early dermatological evaluations\par -recommended after the age of 50 to the age of 70, colonoscopy every 5 years\par \par f/u in 6-8 weeks\par pt is encouraged to call or fax the office with any questions or concerns.

## 2023-02-08 NOTE — PHYSICAL EXAM
[III] : Mallampati Class: III [No Neck Mass] : no neck mass [No Murmurs] : no murmurs [Normal Gait] : normal gait [No Clubbing] : no clubbing [No Cyanosis] : no cyanosis [No Edema] : no edema [FROM] : FROM [Normal Affect] : normal affect [No Acute Distress] : no acute distress [Normal Oropharynx] : normal oropharynx [Normal Appearance] : normal appearance [Normal Rate/Rhythm] : normal rate/rhythm [Normal S1, S2] : normal s1, s2 [No Resp Distress] : no resp distress [Clear to Auscultation Bilaterally] : clear to auscultation bilaterally [No Abnormalities] : no abnormalities [Benign] : benign [Normal Color/ Pigmentation] : normal color/ pigmentation [No Focal Deficits] : no focal deficits [Oriented x3] : oriented x3 [TextBox_68] : I:E 1:3; Clear

## 2023-03-06 ENCOUNTER — APPOINTMENT (OUTPATIENT)
Dept: ENDOCRINOLOGY | Facility: CLINIC | Age: 63
End: 2023-03-06
Payer: COMMERCIAL

## 2023-03-06 VITALS — DIASTOLIC BLOOD PRESSURE: 80 MMHG | SYSTOLIC BLOOD PRESSURE: 130 MMHG

## 2023-03-06 VITALS
HEIGHT: 63.5 IN | BODY MASS INDEX: 40.95 KG/M2 | OXYGEN SATURATION: 96 % | DIASTOLIC BLOOD PRESSURE: 85 MMHG | HEART RATE: 74 BPM | WEIGHT: 234 LBS | TEMPERATURE: 98 F | SYSTOLIC BLOOD PRESSURE: 145 MMHG

## 2023-03-06 LAB — HBA1C MFR BLD HPLC: 8.2

## 2023-03-06 PROCEDURE — 95251 CONT GLUC MNTR ANALYSIS I&R: CPT

## 2023-03-06 PROCEDURE — 99215 OFFICE O/P EST HI 40 MIN: CPT | Mod: 25

## 2023-03-06 PROCEDURE — 83036 HEMOGLOBIN GLYCOSYLATED A1C: CPT | Mod: QW

## 2023-03-06 RX ORDER — TIRZEPATIDE 2.5 MG/.5ML
2.5 INJECTION, SOLUTION SUBCUTANEOUS
Qty: 1 | Refills: 3 | Status: DISCONTINUED | COMMUNITY
Start: 2022-11-02 | End: 2023-03-06

## 2023-03-06 RX ORDER — GLIPIZIDE 2.5 MG/1
2.5 TABLET, FILM COATED, EXTENDED RELEASE ORAL
Qty: 30 | Refills: 0 | Status: DISCONTINUED | COMMUNITY
Start: 2022-12-27 | End: 1900-01-01

## 2023-03-06 NOTE — REVIEW OF SYSTEMS
[Recent Weight Gain (___ Lbs)] : recent weight gain: [unfilled] lbs [Joint Pain] : joint pain [Anxiety] : anxiety [Stress] : stress [Negative] : Endocrine [Fatigue] : no fatigue [Recent Weight Loss (___ Lbs)] : no recent weight loss [Dysphagia] : no dysphagia [Dysphonia] : no dysphonia [SOB on Exertion] : shortness of breath on exertion [Abdominal Pain] : abdominal pain [Polyuria] : no polyuria [Hair Loss] : no hair loss [Pain/Numbness of Digits] : no pain/numbness of digits [Swelling] : no swelling

## 2023-03-06 NOTE — HISTORY OF PRESENT ILLNESS
[FreeTextEntry1] : 62 y.o. female with h/o Type 2 DM diagnosed in 2014, hypothyroidism, hyperlipidemia, thyroid nodules and osteopenia here for follow up visit. Had appendectomy on 12/26/2020. No acute events since last visit. Reports feeling more stressed since last visit. She did speak with a therapist briefly. Now waiting to meet with psychiatrist to try to ween Celexa given her age. \par \par No fatigue. Was doing well with diet called Optavia but was off it given GI symptoms. Weight is up since last visit because of increase in snacking and stress eating. Does have sweets. Taking Januvia 100 mg daily since April 2020 and also taking glipizide ER 5 mg daily. Off Jardiance 25 mg daily because of yeast infection. Intolerant to Metformin, Trulicity and Mounjaro in the past. Stopped Phentermine. She is using Freestyle Addison and reports hypoglycemia in the afternoon with activity in the 60s.  No hypoglycemia. UTD with opthalmology and no retinopathy. No neuropathy. Follows with podiatry. No proteinuria. Following with GI for fatty liver. No exercise. Knee pain is better now, saw orthopedics. Completed PT. \par \par Hyperlipidemia- taking Atorvastatin 20 mg daily\par \par Regarding thyroid disease, no neck complaints. Taking LT4 88 mcg daily. Thyroid sonogram in January 2019 showed stable b/l subcentimeter thyroid nodules with largest in the left lower pole which is 1.2 cm. Thyroid sonogram on 7/8/2020 shows mild increase in size of left lower pole nodule to 1.6 cm. Left mid pole nodule is stable at 0.6 cm. There is a new right 0.7 cm hypoechoic nodule. Thyroid sonogram on 4/19/21 showed stable right 0.8 cm nodule, stable left mid pole 0.7 cm nodule and mild increase in left lower pole nodule to 1.8 cm.  Thyroid sonogram on 9/6/22 showed stable right 0.8 cm hypoechoic  nodule and stable left 0.6 cm hypoechoic mid pole nodule and stable left 1.9 cm lower pole nodule. C/o raspy voice at times. \par \par Also h/o hypercalcemia most likely primary hyperparathyroidism and osteopenia. No h/o kidney stones. No polyuria and polydipsia. No falls or fractures. \par \par DEXA scan in March 2017 showed spine -0.1 and femoral neck -0.7. \par DEXA scan in March 2018 showed femoral neck -1.8, total hip -1.0, 1/3 radius -0.8 and spine -0.5. \par DEXA scan in 9/2020 showed 1/3 radius -1.2 with 4.1% decrease, spine -0.2 which is stable and left femoral neck -1.1 and total hip -0.4 which are stable. \par DEXA scan in 11/2022 showed spine -0.3, femoral neck -1.3 and total hip 0.1. FRAX score 7.3/0.6%\par \par Saw dermatology for hair loss. Hair loss is androgenic in origin. Hair loss has resolved and taking MVI. \par \par Due for cardiology in April 2023 and reports KEYS [Continuous Glucose Monitoring] : Continuous Glucose Monitoring: Yes [Addison] : Addison [FreeTextEntry2] : 51 [FreeTextEntry3] : 49 [FreeTextEntry4] : 0 [de-identified] : 7.7%

## 2023-03-06 NOTE — ASSESSMENT
[Carbohydrate Consistent Diet] : carbohydrate consistent diet [Diabetes Foot Care] : diabetes foot care [Long Term Vascular Complications] : long term vascular complications of diabetes [Importance of Diet and Exercise] : importance of diet and exercise to improve glycemic control, achieve weight loss and improve cardiovascular health [FreeTextEntry1] : 62 y.o. female with h/o Type 2 DM, hyperlipidemia, hypothyroidism and thyroid nodules.\par \par 1. Type 2 DM- Suboptimal control with Hba1c of 8.2% today. Encouraged a carbohydrate consistent diet and exercise. Freestyle Addison download reviewed with hypoglycemia in the afternoon followed by overtreatment. Will discontinue Januvia 100 mg daily and will continue glipizide ER 5 mg daily. Intolerant to Metformin, Trulicity and Mounjaro. Will check CMP and urine alb/cr ratio. \par \par 2. BP is at goal today. Encouraged low sodium diet, exercise and weight loss.  \par \par 3. Hyperlipidemia- Will check lipids and will continue Atorvastatin 20 mg daily.\par \par 4. Hypothyroidism- Patient appears euthyroid and will continue LT4 88 mcg daily for now. Will check TFTs. \par \par 5. Thyroid nodules- Given nodule stability, will monitor for now and repeat thyroid ultrasound in 9/2023. \par \par 6. Vitamin D def/Primary hyperparathyroidism- Will check serum calcium and phosphorus with intact PTH and 25 vitamin D level. Will remain off supplement. May need to consider surgery given hypercalcemia\par \par 7. Obesity- Encouraged exercise and portion control. Intolerant to Metformin and GLP-1 agonists and Mounjaro. Not interested in bariatric surgery. Did see medical weight management program. \par \par 8. Osteopenia- Average risk of fracture. Will monitor for now. Recommend repeat DEXA scan in 2024. Encouraged weight bearing activity. Will check 25 vitamin D level. \par \par Follow up in 3-4 months\par Labs drawn in the office today\par Follow up with psychiatry

## 2023-03-08 LAB
25(OH)D3 SERPL-MCNC: 32 NG/ML
ALBUMIN SERPL ELPH-MCNC: 4.8 G/DL
ALP BLD-CCNC: 97 U/L
ALT SERPL-CCNC: 78 U/L
ANION GAP SERPL CALC-SCNC: 11 MMOL/L
AST SERPL-CCNC: 43 U/L
BASOPHILS # BLD AUTO: 0.05 K/UL
BASOPHILS NFR BLD AUTO: 0.6 %
BILIRUB SERPL-MCNC: 0.4 MG/DL
BUN SERPL-MCNC: 13 MG/DL
CALCIUM SERPL-MCNC: 10.4 MG/DL
CALCIUM SERPL-MCNC: 10.4 MG/DL
CHLORIDE SERPL-SCNC: 101 MMOL/L
CHOLEST SERPL-MCNC: 172 MG/DL
CO2 SERPL-SCNC: 24 MMOL/L
CREAT SERPL-MCNC: 0.71 MG/DL
CREAT SPEC-SCNC: 32 MG/DL
EGFR: 95 ML/MIN/1.73M2
EOSINOPHIL # BLD AUTO: 0.18 K/UL
EOSINOPHIL NFR BLD AUTO: 2.1 %
GLUCOSE SERPL-MCNC: 157 MG/DL
HCT VFR BLD CALC: 46.3 %
HDLC SERPL-MCNC: 53 MG/DL
HGB BLD-MCNC: 14.7 G/DL
IMM GRANULOCYTES NFR BLD AUTO: 0.3 %
LDLC SERPL CALC-MCNC: 82 MG/DL
LYMPHOCYTES # BLD AUTO: 2.07 K/UL
LYMPHOCYTES NFR BLD AUTO: 24 %
MAN DIFF?: NORMAL
MCHC RBC-ENTMCNC: 29.3 PG
MCHC RBC-ENTMCNC: 31.7 GM/DL
MCV RBC AUTO: 92.2 FL
MICROALBUMIN 24H UR DL<=1MG/L-MCNC: <1.2 MG/DL
MICROALBUMIN/CREAT 24H UR-RTO: NORMAL MG/G
MONOCYTES # BLD AUTO: 0.52 K/UL
MONOCYTES NFR BLD AUTO: 6 %
NEUTROPHILS # BLD AUTO: 5.78 K/UL
NEUTROPHILS NFR BLD AUTO: 67 %
NONHDLC SERPL-MCNC: 118 MG/DL
PARATHYROID HORMONE INTACT: 85 PG/ML
PHOSPHATE SERPL-MCNC: 3.5 MG/DL
PLATELET # BLD AUTO: 248 K/UL
POTASSIUM SERPL-SCNC: 5.6 MMOL/L
PROT SERPL-MCNC: 7.1 G/DL
RBC # BLD: 5.02 M/UL
RBC # FLD: 13.1 %
SODIUM SERPL-SCNC: 136 MMOL/L
T4 FREE SERPL-MCNC: 1.4 NG/DL
TRIGL SERPL-MCNC: 182 MG/DL
TSH SERPL-ACNC: 0.59 UIU/ML
WBC # FLD AUTO: 8.63 K/UL

## 2023-03-29 ENCOUNTER — NON-APPOINTMENT (OUTPATIENT)
Age: 63
End: 2023-03-29

## 2023-04-03 RX ORDER — PEN NEEDLE, DIABETIC 29 G X1/2"
32G X 4 MM NEEDLE, DISPOSABLE MISCELLANEOUS
Qty: 1 | Refills: 4 | Status: ACTIVE | COMMUNITY
Start: 2023-04-03 | End: 1900-01-01

## 2023-04-10 ENCOUNTER — RX RENEWAL (OUTPATIENT)
Age: 63
End: 2023-04-10

## 2023-04-14 ENCOUNTER — NON-APPOINTMENT (OUTPATIENT)
Age: 63
End: 2023-04-14

## 2023-04-21 NOTE — HISTORY REVIEWED
Sent patient a live well message letting her know FMLA paperwork was completed and faxed and that the naproxen prescription was sent to her pharmacy on file. Writer also gave patient advisement  from Dr. Naylor to the patient regarding safe use of medication.   [History reviewed] : History reviewed. [Medications and Allergies reviewed] : Medications and allergies reviewed.

## 2023-05-10 ENCOUNTER — APPOINTMENT (OUTPATIENT)
Dept: ENDOCRINOLOGY | Facility: CLINIC | Age: 63
End: 2023-05-10
Payer: COMMERCIAL

## 2023-05-10 PROCEDURE — G0108 DIAB MANAGE TRN  PER INDIV: CPT

## 2023-05-11 RX ORDER — GLIPIZIDE 10 MG/1
10 TABLET, FILM COATED, EXTENDED RELEASE ORAL
Qty: 30 | Refills: 6 | Status: COMPLETED | COMMUNITY
Start: 2022-12-27 | End: 2023-05-11

## 2023-06-02 ENCOUNTER — NON-APPOINTMENT (OUTPATIENT)
Age: 63
End: 2023-06-02

## 2023-07-02 ENCOUNTER — RX RENEWAL (OUTPATIENT)
Age: 63
End: 2023-07-02

## 2023-07-31 ENCOUNTER — APPOINTMENT (OUTPATIENT)
Dept: ENDOCRINOLOGY | Facility: CLINIC | Age: 63
End: 2023-07-31
Payer: COMMERCIAL

## 2023-07-31 VITALS
RESPIRATION RATE: 16 BRPM | BODY MASS INDEX: 40.95 KG/M2 | HEIGHT: 63.5 IN | TEMPERATURE: 98 F | HEART RATE: 83 BPM | SYSTOLIC BLOOD PRESSURE: 143 MMHG | WEIGHT: 234 LBS | DIASTOLIC BLOOD PRESSURE: 75 MMHG | OXYGEN SATURATION: 99 %

## 2023-07-31 VITALS — SYSTOLIC BLOOD PRESSURE: 130 MMHG | DIASTOLIC BLOOD PRESSURE: 80 MMHG

## 2023-07-31 LAB — HBA1C MFR BLD HPLC: 7.4

## 2023-07-31 PROCEDURE — 36415 COLL VENOUS BLD VENIPUNCTURE: CPT

## 2023-07-31 PROCEDURE — 95251 CONT GLUC MNTR ANALYSIS I&R: CPT

## 2023-07-31 PROCEDURE — 99215 OFFICE O/P EST HI 40 MIN: CPT | Mod: 25

## 2023-07-31 PROCEDURE — 83036 HEMOGLOBIN GLYCOSYLATED A1C: CPT | Mod: QW

## 2023-07-31 NOTE — REVIEW OF SYSTEMS
[Recent Weight Gain (___ Lbs)] : recent weight gain: [unfilled] lbs [SOB on Exertion] : shortness of breath on exertion [Abdominal Pain] : abdominal pain [Joint Pain] : joint pain [Anxiety] : anxiety [Stress] : stress [Negative] : Endocrine [Fatigue] : no fatigue [Recent Weight Loss (___ Lbs)] : no recent weight loss [Dysphagia] : no dysphagia [Dysphonia] : no dysphonia [Polyuria] : no polyuria [Hair Loss] : no hair loss [Pain/Numbness of Digits] : no pain/numbness of digits [Swelling] : no swelling

## 2023-07-31 NOTE — HISTORY OF PRESENT ILLNESS
[Continuous Glucose Monitoring] : Continuous Glucose Monitoring: Yes [Addison] : Addison [FreeTextEntry1] : 63 y.o. female with h/o Type 2 DM diagnosed in 2014, hypothyroidism, hyperlipidemia, thyroid nodules and osteopenia here for follow up visit. Had appendectomy on 12/26/2020. No acute events since last visit. Reports feeling more stressed since last visit. She did speak with a therapist briefly. Now waiting to meet with psychiatrist to try to ween Celexa given her age. Did have COVID-19 in May 2023 while in [place].   No fatigue. Was doing well with diet called Optavia but was off it given GI symptoms. Weight is up since last visit because of increase in snacking and stress eating. Does have sweets. Stopped taking Januvia and glipizide ER. Off Jardiance 25 mg daily because of yeast infection. Intolerant to Metformin, Trulicity and Mounjaro in the past. Stopped Phentermine. She is using Freestyle Addison 3.  Does get hypoglycemia. UTD with ophthalmology and no retinopathy. No neuropathy. Follows with podiatry. No proteinuria. Following with GI for fatty liver. No exercise. Knee pain is better now, saw orthopedics. Did get steroid injection in left knee this year. Completed PT.   Takes Humalog 12 units before meals and Basaglar 40 units SQ QHS She is using Freestyle Addison 3  Hyperlipidemia- taking Atorvastatin 20 mg daily  Regarding thyroid disease, no neck complaints. Taking LT4 88 mcg daily. Thyroid sonogram in January 2019 showed stable b/l subcentimeter thyroid nodules with largest in the left lower pole which is 1.2 cm. Thyroid sonogram on 7/8/2020 shows mild increase in size of left lower pole nodule to 1.6 cm. Left mid pole nodule is stable at 0.6 cm. There is a new right 0.7 cm hypoechoic nodule. Thyroid sonogram on 4/19/21 showed stable right 0.8 cm nodule, stable left mid pole 0.7 cm nodule and mild increase in left lower pole nodule to 1.8 cm.  Thyroid sonogram on 9/6/22 showed stable right 0.8 cm hypoechoic  nodule and stable left 0.6 cm hypoechoic mid pole nodule and stable left 1.9 cm lower pole nodule. C/o raspy voice at times.   Also h/o hypercalcemia most likely primary hyperparathyroidism and osteopenia. No h/o kidney stones. No polyuria and polydipsia. No falls or fractures.   DEXA scan in March 2017 showed spine -0.1 and femoral neck -0.7.  DEXA scan in March 2018 showed femoral neck -1.8, total hip -1.0, 1/3 radius -0.8 and spine -0.5.  DEXA scan in 9/2020 showed 1/3 radius -1.2 with 4.1% decrease, spine -0.2 which is stable and left femoral neck -1.1 and total hip -0.4 which are stable.  DEXA scan in 11/2022 showed spine -0.3, femoral neck -1.3 and total hip 0.1. FRAX score 7.3/0.6%  Saw dermatology for hair loss. Hair loss is androgenic in origin. Hair loss has resolved and taking MVI.   UTD with cardiology. No KEYS [FreeTextEntry2] : 56 [FreeTextEntry3] : 44 [FreeTextEntry4] : 0 [de-identified] : 7.7%

## 2023-07-31 NOTE — ASSESSMENT
[Carbohydrate Consistent Diet] : carbohydrate consistent diet [Diabetes Foot Care] : diabetes foot care [Long Term Vascular Complications] : long term vascular complications of diabetes [Importance of Diet and Exercise] : importance of diet and exercise to improve glycemic control, achieve weight loss and improve cardiovascular health [FreeTextEntry1] : 63 y.o. female with h/o Type 2 DM, hyperlipidemia, hypothyroidism and thyroid nodules.  1. Type 2 DM- Suboptimal control with Hba1c of 7.4% today but improved. Encouraged a carbohydrate consistent diet and exercise. Freestyle Addison download reviewed with 56% in range and 44% high. Will increase Humalog to 14 units before dinner and will continue Humalog 12 units before breakfast and lunch. Will decrease Basaglar to 36 units QHS. Will remain off Januvia and glipizide ER. Intolerant to Metformin, Trulicity and Mounjaro. Will check CMP and urine alb/cr ratio.   2. BP is at goal today. Encouraged low sodium diet, exercise and weight loss.    3. Hyperlipidemia- Will check lipids and will continue Atorvastatin 20 mg daily.  4. Hypothyroidism- Patient appears euthyroid and will continue LT4 88 mcg daily for now. Will check TFTs.   5. Thyroid nodules- Given nodule stability, will monitor for now and repeat thyroid ultrasound in 9/2023.   6. Vitamin D def/Primary hyperparathyroidism- Will check serum calcium and phosphorus with intact PTH and 25 vitamin D level. Will remain off supplement. May need to consider surgery given hypercalcemia  7. Obesity- Encouraged exercise and portion control. Intolerant to Metformin and GLP-1 agonists and Mounjaro. Not interested in bariatric surgery. Will refer to medical weight management program.   8. Osteopenia- Average risk of fracture. Will monitor for now. Recommend repeat DEXA scan in 2024. Encouraged weight bearing activity. Will check 25 vitamin D level.   Follow up in 3-4 months Labs drawn in the office today Follow up with psychiatry

## 2023-07-31 NOTE — PHYSICAL EXAM
[Alert] : alert [No Acute Distress] : no acute distress [Normal Sclera/Conjunctiva] : normal sclera/conjunctiva [EOMI] : extra ocular movement intact [No LAD] : no lymphadenopathy [Thyroid Not Enlarged] : the thyroid was not enlarged [No Respiratory Distress] : no respiratory distress [Clear to Auscultation] : lungs were clear to auscultation bilaterally [Normal S1, S2] : normal S1 and S2 [Regular Rhythm] : with a regular rhythm [No Edema] : no peripheral edema [Pedal Pulses Normal] : the pedal pulses are present [Normal Bowel Sounds] : normal bowel sounds [Not Tender] : non-tender [Not Distended] : not distended [Soft] : abdomen soft [Normal Anterior Cervical Nodes] : no anterior cervical lymphadenopathy [No Clubbing, Cyanosis] : no clubbing  or cyanosis of the fingernails [No Rash] : no rash [Right foot was examined, including] : right foot ~C was examined, including visual inspection with sensory and pulse exams [Left foot was examined, including] : left foot ~C was examined, including visual inspection with sensory and pulse exams [2+] : 2+ in the dorsalis pedis [Normal Reflexes] : deep tendon reflexes were 2+ and symmetric [Normal Affect] : the affect was normal [Normal Mood] : the mood was normal [Normal] : normal [Kyphosis] : no kyphosis present [Diminished Throughout Both Feet] : normal tactile sensation with monofilament testing throughout both feet [FreeTextEntry1] : callus [FreeTextEntry5] : callus

## 2023-08-01 LAB
25(OH)D3 SERPL-MCNC: 18.4 NG/ML
ALBUMIN SERPL ELPH-MCNC: 4.7 G/DL
ALP BLD-CCNC: 93 U/L
ALT SERPL-CCNC: 46 U/L
ANION GAP SERPL CALC-SCNC: 12 MMOL/L
AST SERPL-CCNC: 30 U/L
BILIRUB SERPL-MCNC: 0.4 MG/DL
BUN SERPL-MCNC: 17 MG/DL
CALCIUM SERPL-MCNC: 10.3 MG/DL
CALCIUM SERPL-MCNC: 10.3 MG/DL
CHLORIDE SERPL-SCNC: 101 MMOL/L
CHOLEST SERPL-MCNC: 161 MG/DL
CO2 SERPL-SCNC: 25 MMOL/L
CREAT SERPL-MCNC: 0.72 MG/DL
CREAT SPEC-SCNC: 128 MG/DL
EGFR: 93 ML/MIN/1.73M2
GLUCOSE SERPL-MCNC: 106 MG/DL
HDLC SERPL-MCNC: 58 MG/DL
LDLC SERPL CALC-MCNC: 80 MG/DL
MICROALBUMIN 24H UR DL<=1MG/L-MCNC: 2.3 MG/DL
MICROALBUMIN/CREAT 24H UR-RTO: 18 MG/G
NONHDLC SERPL-MCNC: 103 MG/DL
PARATHYROID HORMONE INTACT: 71 PG/ML
PHOSPHATE SERPL-MCNC: 4 MG/DL
POTASSIUM SERPL-SCNC: 5.2 MMOL/L
PROT SERPL-MCNC: 6.9 G/DL
SODIUM SERPL-SCNC: 139 MMOL/L
T4 FREE SERPL-MCNC: 1.3 NG/DL
TRIGL SERPL-MCNC: 131 MG/DL
TSH SERPL-ACNC: 0.2 UIU/ML

## 2023-08-06 NOTE — ED ADULT NURSE NOTE - NSFALLRSKINDICATORS_ED_ALL_ED
Initial Clinical Review  OBSERVATION  8/5/23 @ 1137 CONVERTED TO INPATIENT ADMISSION 8/6/23 @ 1331DUE TO CONTINUED STAY REQUIRED TO EVALUATE AND TREAT PATIENT WITH RUE PAIN, AMBULATORY DYSFUNCTION WITH ONGOING PT, SAFE D/C PLANNING. Admission: Date/Time/Statement:   Admission Orders (From admission, onward)     Ordered        08/06/23 1331  Inpatient Admission  Once            08/05/23 1137  Place in Observation  Once                      Orders Placed This Encounter   Procedures   • Inpatient Admission     Standing Status:   Standing     Number of Occurrences:   1     Order Specific Question:   Level of Care     Answer:   Med Surg [16]     Order Specific Question:   Estimated length of stay     Answer:   More than 2 Midnights     Order Specific Question:   Certification     Answer:   I certify that inpatient services are medically necessary for this patient for a duration of greater than two midnights. See H&P and MD Progress Notes for additional information about the patient's course of treatment. ED Arrival Information     Expected   -    Arrival   8/5/2023 08:21    Acuity   Urgent            Means of arrival   Ambulance    Escorted by   [facility]    Service   Hospitalist    Admission type   Emergency            Arrival complaint   EMS           Chief Complaint   Patient presents with   • Constipation     Pt states he has not had bowel movement in a week. Reports having rectal bleeding this morning. Stool softeners unsuccessful. Denies abdominal pain, does have hemmroids. Initial Presentation: 78 y.o. male PMH of Parsonage-Cordero syndrome, HTN, renal mass with plan for nephrectomy as outpt , DVT RUE on Eliquis who presents to ED from home with constipation, generalized weakness, inability to ambulate, spasms and pain right upper extremity for last 3 to 4 days. Having difficulty with ambulation. He had falls x2, denies injuring his shoulder. Pt had bm in ED.  On exam, unable to move his right shoulder, apperas adrian be in pain ,. Active spasm of right upper extremity. Neck-stiff and spasmodic. Labs WBC 3.90. XR abdomen KUB shows nothing acute . Pt admitted as OBS with diffuse pain RUE. Brachial plexopathy . Plan - Increase Xanaflex to 4 mg, add PRN Robaxin. Neurology consult. Pain control. PT/OT . Continue Eliquis . 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 Has know right rotator cuff injury and he is having pain in his rotator cuff region. Parsonage-Cordero syndrome in R upper extremity is overall improving. He now has some muscle contraction in his right triceps. Plan - PT/OT . Consider CT chest/ab/pelvis w/wo contrast to revaluate renal mass and mets since it might likely be enlarging in the setting of worsening constipation and weight loss    Date: 8/6 Converted to IP   R arm pain 4/10 this am per nsg . Pain likely 2/2 rotator cuff tear. Pt has minimal ROM R shoulder . +2 R radial pulse. Lourena Bailey Pt fatigued , pale, cachectic. . ADLs are limited on LUE as well with limited ROM L shoulder   Pt unable to stand or ambulate independently per nsg notes. IP consult to PMR placed . Mag 1.7 - repleted . Pt has large bm w/ some blood, has hx hemorrhoids, started Miralax. Trend hgb. Per PT-  pt would benefit from continued skilled inpatient PT in the acute care setting in order to address deficits as defined above to maximize function and mobility. Requires assistance for ambulation with using only 1Upper extremity on a rolling walker   PT recommends post acute rehab , may make mobility progress over the next several days. Potential discharge home w/home family support HHPT. Date 8/7 day 2 /    Day 3: Has surpassed a 2nd midnight with active treatments and services, which include :   Hgb 12.5 today. Continues Eliquis for hx DVT RUE .  XR R shoulder today shows no acute fx, moderate glenohumeral arthritis, suggests rotator cuff retear . Await  PMR consult . Pt c/o constipation with last bm 8/5  Pt reports RUE pain 6/10 increased from yesterday. Pain alleviated by laying flat . Muscle relaxants have also been helping, although he did say made him drowsy. Says that he is interested in rehab but is worried about insurance and the costs  OT- Home with home health rehabilitation (may benefit from follow up with OP OT for hand therapy)  PT recommends post acute rehabilitation services based on current presentation. Pt may make mobility progress over the next several days. Potential discharge home w/home family support HHPT.       ED Triage Vitals   Temperature Pulse Respirations Blood Pressure SpO2   08/05/23 0825 08/05/23 0825 08/05/23 0825 08/05/23 0825 08/05/23 0825   97.7 °F (36.5 °C) 69 16 130/83 97 %      Temp Source Heart Rate Source Patient Position - Orthostatic VS BP Location FiO2 (%)   08/05/23 0825 08/05/23 0825 08/05/23 1100 08/05/23 0825 --   Oral Monitor Lying Right arm       Pain Score       08/05/23 0825       No Pain          Wt Readings from Last 1 Encounters:   08/07/23 60 kg (132 lb 4.4 oz)     Additional Vital Signs:   Date/Time Temp Pulse Resp BP MAP (mmHg) SpO2   08/07/23 07:33:32 -- 63 15 131/86 101 97 %   08/06/23 22:11:19 98.1 °F (36.7 °C) 63 18 129/80 96 97 %   08/06/23 15:06:58 98.2 °F (36.8 °C) 67 18 122/65 84 98 %     Date/Time Temp Pulse Resp BP MAP (mmHg) SpO2   08/06/23 08:19:41 98.1 °F (36.7 °C) 66 17 124/65 85 97 %   08/06/23 02:47:54 -- 52 Abnormal  -- 114/62 79 98 %   08/05/23 21:32:34 98.2 °F (36.8 °C) 62 18 134/73 93 96 %   08/05/23 17:40:45 -- 59 -- 126/70 89 98 %   08/05/23 1525 -- -- -- 98/54 -- --   08/05/23 15:23:24 97.8 °F (36.6 °C) 60 18 92/51 65 96 %   08/05/23 12:58:48 97.9 °F (36.6 °C) 68 18 158/84 105 97 %   08/05/23 1130 -- 65 16 153/74 106 98 %   08/05/23 1100 -- 55 16 122/64 87 97 %     Date and Time R Radial Pulse L Radial Pulse R Pedal Pulse L Pedal Pulse   08/05/23 2038 +2 +2 +2 +2   08/05/23 1328 +2 +2 +2 +2         Pertinent Labs/Diagnostic Test Results:   XR shoulder 2+ vw right   Final Result by Jan Uribe MD (08/07 1143)      No acute displaced fracture   Moderate glenohumeral arthritis   Postsurgical changes from the rotator cuff repair with tendon anchors in place   Chronic appearing erosion in the greater tuberosity, probably sequela of pressure erosion from the overlying acromion due to superior migration of the humeral head   Superior migration of the humeral head, suggesting rotator cuff retear      Resident: Elvin Corea I, the attending radiologist, have reviewed the images and agree with the final report above. Workstation performed: [id number]         XR abdomen 1 view kub   ED Interpretation by Pilar Kayser, MD (08/05 1122)   This film was interpreted independently by me. No evidence for obstruction, moderate stool burden noted. Final Result by Myron Espinal MD (08/06 1832)      Moderate stool throughout the colon.                Workstation performed: [id number]               Results from last 7 days   Lab Units 08/07/23  0444 08/06/23  0434 08/05/23  0942   WBC Thousand/uL 4.71 3.90* 3.90*   HEMOGLOBIN g/dL 12.5 11.3* 13.7   HEMATOCRIT % 37.3 34.2* 41.1   PLATELETS Thousands/uL 218 217 293   NEUTROS ABS Thousands/µL  --   --  2.34         Results from last 7 days   Lab Units 08/06/23  0434 08/05/23  0942   SODIUM mmol/L 140 136   POTASSIUM mmol/L 3.5 3.7   CHLORIDE mmol/L 112* 104   CO2 mmol/L 24 25   ANION GAP mmol/L 4 7   BUN mg/dL 9 16   CREATININE mg/dL 0.48* 0.54*   EGFR ml/min/1.73sq m 111 106   CALCIUM mg/dL 7.6* 9.0   MAGNESIUM mg/dL 1.7* 2.1   PHOSPHORUS mg/dL  --  2.8     Results from last 7 days   Lab Units 08/05/23  0942   AST U/L 26   ALT U/L 24   ALK PHOS U/L 37   TOTAL PROTEIN g/dL 6.0*   ALBUMIN g/dL 4.0   TOTAL BILIRUBIN mg/dL 0.79     Results from last 7 days   Lab Units 08/07/23  1233 08/07/23  0622 08/06/23  1751 08/06/23  1426   POC GLUCOSE mg/dl 97 80 120 115     Results from last 7 days   Lab Units 08/06/23  0434 08/05/23  0942   GLUCOSE RANDOM mg/dL 402* 100             ED Treatment:   Medication Administration from 08/05/2023 0821 to 08/05/2023 1245       Date/Time Order Dose Route Action     08/05/2023 0948 EDT ketorolac (TORADOL) injection 15 mg 15 mg Intramuscular Not Given     08/05/2023 0940 EDT ketorolac (TORADOL) injection 15 mg 15 mg Intravenous Given     08/05/2023 1133 EDT sodium chloride 0.9 % bolus 1,000 mL 0 mL Intravenous Stopped     08/05/2023 0949 EDT sodium chloride 0.9 % bolus 1,000 mL 1,000 mL Intravenous New Bag     08/05/2023 1033 EDT HYDROmorphone (DILAUDID) injection 0.5 mg 0.5 mg Intravenous Given        Past Medical History:   Diagnosis Date   • Allergic    • Arthritis    • Asthma    • Cancer ([address])     kidney   • Hypertension    • Impaired fasting glucose    • Mitral valve disorder    • Mitral valve prolapse    • Murmur, cardiac    • Nodular prostate without lower urinary tract symptoms    • PAC (premature atrial contraction)    • Parsonage-Cordero syndrome    • PVC (premature ventricular contraction)    • PVC (premature ventricular contraction)    • Renal cancer ([address]) 2023   • Thyroid nodule      Present on Admission:  • Acute deep vein thrombosis (DVT) of brachial vein of right upper extremity (HCC)  • Constipation  • Essential hypertension  • Right renal mass      Admitting Diagnosis: Weakness [R53.1]  Shoulder pain [M25.519]  Weight loss [R63.4]  Constipation [K59.00]  Neuropathy of right upper extremity [G56.91]  Age/Sex: 78 y.o. male  Admission Orders:  Scheduled Medications:  amLODIPine, 5 mg, Oral, Daily  apixaban, 5 mg, Oral, BID  docusate sodium, 100 mg, Oral, BID  gabapentin, 100 mg, Oral, HS  lisinopril, 20 mg, Oral, QAM  loratadine, 10 mg, Oral, Daily  metoprolol succinate, 25 mg, Oral, Daily With Breakfast  polyethylene glycol, 17 g, Oral, Daily  tiZANidine, 4 mg, Oral, TID    magnesium Oxide (MAG-OX) tablet 400 mg  Dose: 400 mg  Freq: Once Route: PO  Indications of Use: HYPOMAGNESEMIA  Start: 08/06/23 1300 End: 08/06/23 1256      Continuous IV Infusions:  dextrose 5 % and sodium chloride 0.9 %, 125 mL/hr, Intravenous, ContinuousEnd: 08/06/23 1323      PRN Meds:  bisacodyl, 5 mg, Oral, Daily PRN  busPIRone, 7.5 mg, Oral, BID PRN  calcium carbonate, 1,000 mg, Oral, Daily PRN  HYDROmorphone, 0.5 mg, Intravenous, Q4H PRN  ketorolac, 15 mg, Intravenous, Q6H PRN  meclizine, 25 mg, Oral, TID PRN  methocarbamol, 500 mg, Oral, Q6H PRN  ondansetron, 4 mg, Intravenous, Q6H PRN  oxyCODONE, 5 mg, Oral, Q4H PRN x2 8/5  oxyCODONE, 2.5 mg, Oral, Q6H PRN    SCD   OOB as jaqueline    IP CONSULT TO NEUROLOGY  IP CONSULT TO PHYSICAL MEDICINE REHAB    Network Utilization Review Department  ATTENTION: Please call with any questions or concerns to [phone] and carefully listen to the prompts so that you are directed to the right person. All voicemails are confidential.  Theta Heriberto all requests for admission clinical reviews, approved or denied determinations and any other requests to dedicated fax number below belonging to the campus where the patient is receiving treatment.  List of dedicated fax numbers for the Facilities:  [place] DENIALS (Administrative/Medical Necessity) [phone] [facility] [facility] [phone]   [address] [place] [address] Drive [phone]   [facility] [address] [facility] [phone] 150[address] [facility] [address] [place] [address]   [address] [facility] [address] [phone]   [address] [place] [address]  [facility] [phone] no

## 2023-08-10 RX ORDER — INSULIN GLARGINE 100 [IU]/ML
100 INJECTION, SOLUTION SUBCUTANEOUS
Qty: 1 | Refills: 6 | Status: ACTIVE | COMMUNITY
Start: 2023-04-03 | End: 1900-01-01

## 2023-08-14 ENCOUNTER — APPOINTMENT (OUTPATIENT)
Dept: PULMONOLOGY | Facility: CLINIC | Age: 63
End: 2023-08-14

## 2023-08-16 ENCOUNTER — EMERGENCY (EMERGENCY)
Facility: HOSPITAL | Age: 63
LOS: 0 days | Discharge: HOME | End: 2023-08-16
Attending: STUDENT IN AN ORGANIZED HEALTH CARE EDUCATION/TRAINING PROGRAM
Payer: COMMERCIAL

## 2023-08-16 VITALS
HEART RATE: 100 BPM | OXYGEN SATURATION: 98 % | SYSTOLIC BLOOD PRESSURE: 154 MMHG | TEMPERATURE: 98 F | DIASTOLIC BLOOD PRESSURE: 79 MMHG | RESPIRATION RATE: 18 BRPM

## 2023-08-16 VITALS — WEIGHT: 229.94 LBS

## 2023-08-16 DIAGNOSIS — M25.562 PAIN IN LEFT KNEE: ICD-10-CM

## 2023-08-16 DIAGNOSIS — Z79.84 LONG TERM (CURRENT) USE OF ORAL HYPOGLYCEMIC DRUGS: ICD-10-CM

## 2023-08-16 DIAGNOSIS — N39.0 URINARY TRACT INFECTION, SITE NOT SPECIFIED: ICD-10-CM

## 2023-08-16 DIAGNOSIS — Z98.891 HISTORY OF UTERINE SCAR FROM PREVIOUS SURGERY: Chronic | ICD-10-CM

## 2023-08-16 DIAGNOSIS — Z88.0 ALLERGY STATUS TO PENICILLIN: ICD-10-CM

## 2023-08-16 DIAGNOSIS — Z98.890 OTHER SPECIFIED POSTPROCEDURAL STATES: Chronic | ICD-10-CM

## 2023-08-16 DIAGNOSIS — M25.552 PAIN IN LEFT HIP: ICD-10-CM

## 2023-08-16 DIAGNOSIS — E78.5 HYPERLIPIDEMIA, UNSPECIFIED: ICD-10-CM

## 2023-08-16 DIAGNOSIS — Z86.39 PERSONAL HISTORY OF OTHER ENDOCRINE, NUTRITIONAL AND METABOLIC DISEASE: ICD-10-CM

## 2023-08-16 DIAGNOSIS — E11.9 TYPE 2 DIABETES MELLITUS WITHOUT COMPLICATIONS: ICD-10-CM

## 2023-08-16 DIAGNOSIS — G89.29 OTHER CHRONIC PAIN: ICD-10-CM

## 2023-08-16 DIAGNOSIS — E03.9 HYPOTHYROIDISM, UNSPECIFIED: ICD-10-CM

## 2023-08-16 DIAGNOSIS — F41.9 ANXIETY DISORDER, UNSPECIFIED: ICD-10-CM

## 2023-08-16 LAB
ALBUMIN SERPL ELPH-MCNC: 4 G/DL — SIGNIFICANT CHANGE UP (ref 3.3–5)
ALP SERPL-CCNC: 96 U/L — SIGNIFICANT CHANGE UP (ref 40–120)
ALT FLD-CCNC: 48 U/L — SIGNIFICANT CHANGE UP (ref 12–78)
ANION GAP SERPL CALC-SCNC: 4 MMOL/L — LOW (ref 5–17)
APPEARANCE UR: CLEAR — SIGNIFICANT CHANGE UP
AST SERPL-CCNC: 26 U/L — SIGNIFICANT CHANGE UP (ref 15–37)
BACTERIA # UR AUTO: ABNORMAL
BASOPHILS # BLD AUTO: 0.04 K/UL — SIGNIFICANT CHANGE UP (ref 0–0.2)
BASOPHILS NFR BLD AUTO: 0.4 % — SIGNIFICANT CHANGE UP (ref 0–2)
BILIRUB SERPL-MCNC: 0.4 MG/DL — SIGNIFICANT CHANGE UP (ref 0.2–1.2)
BILIRUB UR-MCNC: NEGATIVE — SIGNIFICANT CHANGE UP
BUN SERPL-MCNC: 16 MG/DL — SIGNIFICANT CHANGE UP (ref 7–23)
CALCIUM SERPL-MCNC: 10.5 MG/DL — HIGH (ref 8.5–10.1)
CHLORIDE SERPL-SCNC: 106 MMOL/L — SIGNIFICANT CHANGE UP (ref 96–108)
CO2 SERPL-SCNC: 26 MMOL/L — SIGNIFICANT CHANGE UP (ref 22–31)
COLOR SPEC: YELLOW — SIGNIFICANT CHANGE UP
CREAT SERPL-MCNC: 0.8 MG/DL — SIGNIFICANT CHANGE UP (ref 0.5–1.3)
DIFF PNL FLD: NEGATIVE — SIGNIFICANT CHANGE UP
EGFR: 82 ML/MIN/1.73M2 — SIGNIFICANT CHANGE UP
EOSINOPHIL # BLD AUTO: 0.18 K/UL — SIGNIFICANT CHANGE UP (ref 0–0.5)
EOSINOPHIL NFR BLD AUTO: 1.7 % — SIGNIFICANT CHANGE UP (ref 0–6)
EPI CELLS # UR: SIGNIFICANT CHANGE UP
GLUCOSE SERPL-MCNC: 184 MG/DL — HIGH (ref 70–99)
GLUCOSE UR QL: NEGATIVE — SIGNIFICANT CHANGE UP
HCT VFR BLD CALC: 42 % — SIGNIFICANT CHANGE UP (ref 34.5–45)
HGB BLD-MCNC: 14.6 G/DL — SIGNIFICANT CHANGE UP (ref 11.5–15.5)
IMM GRANULOCYTES NFR BLD AUTO: 0.2 % — SIGNIFICANT CHANGE UP (ref 0–0.9)
KETONES UR-MCNC: NEGATIVE — SIGNIFICANT CHANGE UP
LEUKOCYTE ESTERASE UR-ACNC: ABNORMAL
LYMPHOCYTES # BLD AUTO: 1.67 K/UL — SIGNIFICANT CHANGE UP (ref 1–3.3)
LYMPHOCYTES # BLD AUTO: 15.9 % — SIGNIFICANT CHANGE UP (ref 13–44)
MCHC RBC-ENTMCNC: 30.2 PG — SIGNIFICANT CHANGE UP (ref 27–34)
MCHC RBC-ENTMCNC: 34.8 GM/DL — SIGNIFICANT CHANGE UP (ref 32–36)
MCV RBC AUTO: 86.8 FL — SIGNIFICANT CHANGE UP (ref 80–100)
MONOCYTES # BLD AUTO: 0.57 K/UL — SIGNIFICANT CHANGE UP (ref 0–0.9)
MONOCYTES NFR BLD AUTO: 5.4 % — SIGNIFICANT CHANGE UP (ref 2–14)
NEUTROPHILS # BLD AUTO: 8.05 K/UL — HIGH (ref 1.8–7.4)
NEUTROPHILS NFR BLD AUTO: 76.4 % — SIGNIFICANT CHANGE UP (ref 43–77)
NITRITE UR-MCNC: NEGATIVE — SIGNIFICANT CHANGE UP
PH UR: 5 — SIGNIFICANT CHANGE UP (ref 5–8)
PLATELET # BLD AUTO: 255 K/UL — SIGNIFICANT CHANGE UP (ref 150–400)
POTASSIUM SERPL-MCNC: 4.5 MMOL/L — SIGNIFICANT CHANGE UP (ref 3.5–5.3)
POTASSIUM SERPL-SCNC: 4.5 MMOL/L — SIGNIFICANT CHANGE UP (ref 3.5–5.3)
PROT SERPL-MCNC: 7.8 GM/DL — SIGNIFICANT CHANGE UP (ref 6–8.3)
PROT UR-MCNC: NEGATIVE — SIGNIFICANT CHANGE UP
RBC # BLD: 4.84 M/UL — SIGNIFICANT CHANGE UP (ref 3.8–5.2)
RBC # FLD: 13 % — SIGNIFICANT CHANGE UP (ref 10.3–14.5)
RBC CASTS # UR COMP ASSIST: ABNORMAL /HPF (ref 0–4)
SODIUM SERPL-SCNC: 136 MMOL/L — SIGNIFICANT CHANGE UP (ref 135–145)
SP GR SPEC: 1.01 — SIGNIFICANT CHANGE UP (ref 1.01–1.02)
UROBILINOGEN FLD QL: NEGATIVE — SIGNIFICANT CHANGE UP
WBC # BLD: 10.53 K/UL — HIGH (ref 3.8–10.5)
WBC # FLD AUTO: 10.53 K/UL — HIGH (ref 3.8–10.5)
WBC UR QL: >50 /HPF (ref 0–5)

## 2023-08-16 PROCEDURE — 99285 EMERGENCY DEPT VISIT HI MDM: CPT | Mod: 25

## 2023-08-16 PROCEDURE — 93971 EXTREMITY STUDY: CPT | Mod: LT

## 2023-08-16 PROCEDURE — 87086 URINE CULTURE/COLONY COUNT: CPT

## 2023-08-16 PROCEDURE — 81001 URINALYSIS AUTO W/SCOPE: CPT

## 2023-08-16 PROCEDURE — 93971 EXTREMITY STUDY: CPT | Mod: 26,LT

## 2023-08-16 PROCEDURE — 99285 EMERGENCY DEPT VISIT HI MDM: CPT

## 2023-08-16 PROCEDURE — 85025 COMPLETE CBC W/AUTO DIFF WBC: CPT

## 2023-08-16 PROCEDURE — 73502 X-RAY EXAM HIP UNI 2-3 VIEWS: CPT | Mod: LT

## 2023-08-16 PROCEDURE — 80053 COMPREHEN METABOLIC PANEL: CPT

## 2023-08-16 PROCEDURE — 36415 COLL VENOUS BLD VENIPUNCTURE: CPT

## 2023-08-16 PROCEDURE — 73502 X-RAY EXAM HIP UNI 2-3 VIEWS: CPT | Mod: 26,LT

## 2023-08-16 RX ORDER — IBUPROFEN 200 MG
600 TABLET ORAL ONCE
Refills: 0 | Status: DISCONTINUED | OUTPATIENT
Start: 2023-08-16 | End: 2023-08-16

## 2023-08-16 NOTE — ED ADULT TRIAGE NOTE - CHIEF COMPLAINT QUOTE
PW left groin pain. recent COVID infection in May. Sp L knee s/x and receiving steroid injections and gel injections. last shot past Friday, developed L groin pain soon after the injection. sent by [facility] w/u.

## 2023-08-16 NOTE — ED STATDOCS - PROGRESS NOTE DETAILS
64 y/o F with PMH of DM, HLD, hypothyroid, knee pain presents with hip and knee pain after receiving intraarticular knee injection 5 days ago. Denies fever, chills. Ambulatory. Denies trauma. PE: Well appearing. Cardiac: s1s2, RRR. lungs: CTAB. MSK: No obvious erythema, deformity to lower extremities. Full ROM bilateral LE. Sensation intact to light touch in lower extremities. 5/5 LE strength. A/p: r/o DVT, low suspicion for septic joint. Plan for labs, XR, US, reassess. - Alber Dobbs PA-C

## 2023-08-16 NOTE — ED ADULT NURSE NOTE - NSFALLUNIVINTERV_ED_ALL_ED
Bed/Stretcher in lowest position, wheels locked, appropriate side rails in place/Call bell, personal items and telephone in reach/Instruct patient to call for assistance before getting out of bed/chair/stretcher/Non-slip footwear applied when patient is off stretcher/[place] to call system/Physically safe environment - no spills, clutter or unnecessary equipment/Purposeful proactive rounding/Room/bathroom lighting operational, light cord in reach

## 2023-08-16 NOTE — ED STATDOCS - NS ED ATTENDING STATEMENT MOD
This was a shared visit with the SANTO. I reviewed and verified the documentation and independently performed the documented:

## 2023-08-16 NOTE — ED STATDOCS - PATIENT PORTAL LINK FT
You can access the FollowMyHealth Patient Portal offered by [facility] by registering at the following website: [email or web address]. By joining [employer]’s FollowMyHealth portal, you will also be able to view your health information using other applications (apps) compatible with our system.

## 2023-08-16 NOTE — ED STATDOCS - ATTENDING APP SHARED VISIT CONTRIBUTION OF CARE
I, Brian Canchola, DO personally saw the patient with SANTO.  I have personally performed a face to face diagnostic evaluation on this patient.  I have reviewed the SANTO note and agree with the history, exam, and plan of care, except as noted.  I personally saw the patient and performed a substantive portion of the visit including all aspects of the medical decision making.

## 2023-08-16 NOTE — ED ADULT NURSE NOTE - OBJECTIVE STATEMENT
Pt. is a 63YOF c/o left groin pain. s/p L knee s/x and receiving steroid injections and gel injections. last shot past Friday, developed L groin pain soon after the injection. sent by [facility] orthopedic w/u. Pt. ambulatory in ED. labs sent

## 2023-08-16 NOTE — ED STATDOCS - OBJECTIVE STATEMENT
62 y/o female with PMHx of chronic knee pain, DM2, HLD, hypothyroid coming in with left hip pain x5 days which started after getting a left knee injection by orthopedic doctor. Able to bear weight but with pain. Denies fevers, numbness/tingling. Denies recent falls or trauma. Patient took Motrin today for the pain.

## 2023-08-16 NOTE — ED STATDOCS - CLINICAL SUMMARY MEDICAL DECISION MAKING FREE TEXT BOX
Evaluation for hip pain. XR, blood work to check kidney function, urinalysis, and discharge if all negative.

## 2023-08-18 LAB
CULTURE RESULTS: SIGNIFICANT CHANGE UP
SPECIMEN SOURCE: SIGNIFICANT CHANGE UP

## 2023-09-18 ENCOUNTER — RESULT REVIEW (OUTPATIENT)
Age: 63
End: 2023-09-18

## 2023-10-10 ENCOUNTER — RESULT REVIEW (OUTPATIENT)
Age: 63
End: 2023-10-10

## 2023-10-10 ENCOUNTER — OUTPATIENT (OUTPATIENT)
Dept: OUTPATIENT SERVICES | Facility: HOSPITAL | Age: 63
LOS: 1 days | End: 2023-10-10
Payer: COMMERCIAL

## 2023-10-10 ENCOUNTER — APPOINTMENT (OUTPATIENT)
Dept: ULTRASOUND IMAGING | Facility: IMAGING CENTER | Age: 63
End: 2023-10-10
Payer: COMMERCIAL

## 2023-10-10 DIAGNOSIS — Z98.891 HISTORY OF UTERINE SCAR FROM PREVIOUS SURGERY: Chronic | ICD-10-CM

## 2023-10-10 DIAGNOSIS — E04.2 NONTOXIC MULTINODULAR GOITER: ICD-10-CM

## 2023-10-10 DIAGNOSIS — Z98.890 OTHER SPECIFIED POSTPROCEDURAL STATES: Chronic | ICD-10-CM

## 2023-10-10 PROCEDURE — 88172 CYTP DX EVAL FNA 1ST EA SITE: CPT

## 2023-10-10 PROCEDURE — 88173 CYTOPATH EVAL FNA REPORT: CPT

## 2023-10-10 PROCEDURE — 10005 FNA BX W/US GDN 1ST LES: CPT

## 2023-10-10 PROCEDURE — 88173 CYTOPATH EVAL FNA REPORT: CPT | Mod: 26

## 2023-10-13 LAB — NON-GYNECOLOGICAL CYTOLOGY STUDY: SIGNIFICANT CHANGE UP

## 2023-11-08 ENCOUNTER — NON-APPOINTMENT (OUTPATIENT)
Age: 63
End: 2023-11-08

## 2023-12-11 ENCOUNTER — APPOINTMENT (OUTPATIENT)
Dept: ENDOCRINOLOGY | Facility: CLINIC | Age: 63
End: 2023-12-11
Payer: COMMERCIAL

## 2023-12-11 ENCOUNTER — RESULT CHARGE (OUTPATIENT)
Age: 63
End: 2023-12-11

## 2023-12-11 VITALS — SYSTOLIC BLOOD PRESSURE: 132 MMHG | DIASTOLIC BLOOD PRESSURE: 80 MMHG

## 2023-12-11 VITALS
DIASTOLIC BLOOD PRESSURE: 84 MMHG | WEIGHT: 239 LBS | HEART RATE: 78 BPM | SYSTOLIC BLOOD PRESSURE: 136 MMHG | OXYGEN SATURATION: 96 % | TEMPERATURE: 98.1 F | HEIGHT: 63.5 IN | BODY MASS INDEX: 41.82 KG/M2

## 2023-12-11 PROCEDURE — 99215 OFFICE O/P EST HI 40 MIN: CPT | Mod: 25

## 2023-12-11 PROCEDURE — 95251 CONT GLUC MNTR ANALYSIS I&R: CPT

## 2023-12-11 PROCEDURE — 36415 COLL VENOUS BLD VENIPUNCTURE: CPT

## 2023-12-11 PROCEDURE — 83036 HEMOGLOBIN GLYCOSYLATED A1C: CPT | Mod: QW

## 2023-12-12 LAB
25(OH)D3 SERPL-MCNC: 29 NG/ML
ALBUMIN SERPL ELPH-MCNC: 4.7 G/DL
ALP BLD-CCNC: 97 U/L
ALT SERPL-CCNC: 42 U/L
ANION GAP SERPL CALC-SCNC: 12 MMOL/L
AST SERPL-CCNC: 25 U/L
BILIRUB SERPL-MCNC: 0.2 MG/DL
BUN SERPL-MCNC: 14 MG/DL
CALCIUM SERPL-MCNC: 10 MG/DL
CALCIUM SERPL-MCNC: 10 MG/DL
CHLORIDE SERPL-SCNC: 102 MMOL/L
CHOLEST SERPL-MCNC: 176 MG/DL
CO2 SERPL-SCNC: 24 MMOL/L
CREAT SERPL-MCNC: 0.77 MG/DL
EGFR: 86 ML/MIN/1.73M2
GLUCOSE SERPL-MCNC: 153 MG/DL
HDLC SERPL-MCNC: 53 MG/DL
LDLC SERPL CALC-MCNC: 93 MG/DL
NONHDLC SERPL-MCNC: 123 MG/DL
PARATHYROID HORMONE INTACT: 68 PG/ML
PHOSPHATE SERPL-MCNC: 3.8 MG/DL
POTASSIUM SERPL-SCNC: 4.7 MMOL/L
PROT SERPL-MCNC: 6.7 G/DL
SODIUM SERPL-SCNC: 138 MMOL/L
T4 FREE SERPL-MCNC: 1.4 NG/DL
TRIGL SERPL-MCNC: 179 MG/DL
TSH SERPL-ACNC: 0.87 UIU/ML

## 2023-12-14 LAB — HBA1C MFR BLD HPLC: NORMAL

## 2024-01-22 ENCOUNTER — RX RENEWAL (OUTPATIENT)
Age: 64
End: 2024-01-22

## 2024-01-22 RX ORDER — INSULIN LISPRO 100 [IU]/ML
100 INJECTION, SOLUTION INTRAVENOUS; SUBCUTANEOUS
Qty: 2 | Refills: 6 | Status: ACTIVE | COMMUNITY
Start: 2023-05-11 | End: 1900-01-01

## 2024-04-15 ENCOUNTER — APPOINTMENT (OUTPATIENT)
Dept: ENDOCRINOLOGY | Facility: CLINIC | Age: 64
End: 2024-04-15
Payer: COMMERCIAL

## 2024-04-15 VITALS
BODY MASS INDEX: 43.41 KG/M2 | WEIGHT: 245 LBS | SYSTOLIC BLOOD PRESSURE: 110 MMHG | HEIGHT: 63 IN | HEART RATE: 81 BPM | DIASTOLIC BLOOD PRESSURE: 77 MMHG | OXYGEN SATURATION: 96 %

## 2024-04-15 DIAGNOSIS — M85.80 OTHER SPECIFIED DISORDERS OF BONE DENSITY AND STRUCTURE, UNSPECIFIED SITE: ICD-10-CM

## 2024-04-15 DIAGNOSIS — E66.9 OBESITY, UNSPECIFIED: ICD-10-CM

## 2024-04-15 DIAGNOSIS — E78.5 HYPERLIPIDEMIA, UNSPECIFIED: ICD-10-CM

## 2024-04-15 DIAGNOSIS — E04.2 NONTOXIC MULTINODULAR GOITER: ICD-10-CM

## 2024-04-15 DIAGNOSIS — E55.9 VITAMIN D DEFICIENCY, UNSPECIFIED: ICD-10-CM

## 2024-04-15 DIAGNOSIS — E83.52 HYPERCALCEMIA: ICD-10-CM

## 2024-04-15 DIAGNOSIS — E03.9 HYPOTHYROIDISM, UNSPECIFIED: ICD-10-CM

## 2024-04-15 DIAGNOSIS — E21.0 PRIMARY HYPERPARATHYROIDISM: ICD-10-CM

## 2024-04-15 DIAGNOSIS — E11.9 TYPE 2 DIABETES MELLITUS W/OUT COMPLICATIONS: ICD-10-CM

## 2024-04-15 LAB — HBA1C MFR BLD HPLC: 8.1

## 2024-04-15 PROCEDURE — 99215 OFFICE O/P EST HI 40 MIN: CPT

## 2024-04-15 PROCEDURE — 36415 COLL VENOUS BLD VENIPUNCTURE: CPT | Mod: 59

## 2024-04-15 PROCEDURE — 83036 HEMOGLOBIN GLYCOSYLATED A1C: CPT | Mod: QW

## 2024-04-15 PROCEDURE — 95251 CONT GLUC MNTR ANALYSIS I&R: CPT

## 2024-04-15 RX ORDER — SERTRALINE HYDROCHLORIDE 50 MG/1
50 TABLET, FILM COATED ORAL
Qty: 30 | Refills: 0 | Status: ACTIVE | COMMUNITY
Start: 2024-04-06

## 2024-04-15 RX ORDER — SERTRALINE 25 MG/1
25 TABLET, FILM COATED ORAL
Qty: 30 | Refills: 0 | Status: ACTIVE | COMMUNITY
Start: 2024-04-04

## 2024-04-15 RX ORDER — CITALOPRAM HYDROBROMIDE 10 MG/1
10 TABLET, FILM COATED ORAL
Qty: 30 | Refills: 0 | Status: DISCONTINUED | COMMUNITY
Start: 2023-12-14

## 2024-04-15 RX ORDER — CITALOPRAM HYDROBROMIDE 20 MG/1
20 TABLET, FILM COATED ORAL
Qty: 30 | Refills: 0 | Status: DISCONTINUED | COMMUNITY
Start: 2023-10-24

## 2024-04-15 NOTE — REVIEW OF SYSTEMS
[Recent Weight Gain (___ Lbs)] : recent weight gain: [unfilled] lbs [Joint Pain] : joint pain [Pain/Numbness of Digits] : pain/numbness of digits [Anxiety] : anxiety [Stress] : stress [Negative] : Endocrine [Fatigue] : no fatigue [Recent Weight Loss (___ Lbs)] : no recent weight loss [Dysphagia] : no dysphagia [Neck Pain] : no neck pain [Dysphonia] : no dysphonia [Polyuria] : no polyuria [Hair Loss] : no hair loss [Swelling] : no swelling

## 2024-04-15 NOTE — ASSESSMENT
[Carbohydrate Consistent Diet] : carbohydrate consistent diet [Diabetes Foot Care] : diabetes foot care [Long Term Vascular Complications] : long term vascular complications of diabetes [Importance of Diet and Exercise] : importance of diet and exercise to improve glycemic control, achieve weight loss and improve cardiovascular health [FreeTextEntry1] : 63 y.o. female with h/o Type 2 DM, hyperlipidemia, hypothyroidism and thyroid nodules.  1. Type 2 DM- Suboptimal control with Hba1c of 8.1% today. Encouraged a carbohydrate consistent diet and exercise. Freestyle Addison download reviewed with 45% in range and 55% high. Will continue Humalog 12/12/ 12 units before meals. Will continue Basaglar 40 units QHS. Will remain off Januvia and glipizide ER. Intolerant to Metformin, Trulicity and Mounjaro. Will proceed with trial of Ozempic and sample pen given. Will start Ozempic 0.25 mg SQ weekly for 4 weeks and then titrate up if tolerated. Reviewed risks and benefits of GLP-1 Herbert including GI side effects, pancreatitis and MTC. Will check CMP and urine alb/cr ratio.  2. BP is at goal today. Encouraged low sodium diet, exercise and weight loss.  3. Hyperlipidemia- Will check lipids and will continue Atorvastatin 20 mg daily.  4. Hypothyroidism- Patient appears euthyroid and will continue LT4 88 mcg daily for now. Will check TFTs.  5. Thyroid nodules- Given nodule stability and benign FNA of left lower pole nodule, will monitor for now and repeat thyroid ultrasound in 2024.  6. Vitamin D def/Primary hyperparathyroidism- Will check serum calcium and phosphorus with intact PTH and 25 vitamin D level. Will remain off supplement. May need to consider surgery given hypercalcemia.   7. Obesity- Encouraged exercise and portion control. Intolerant to Metformin and GLP-1 Herbert (Trulicity and Mounjaro). Not interested in bariatric surgery. Will start trial of Ozempic. Will check midnight salivary cortisol.   8. Osteopenia- Average risk of fracture. Will monitor for now. Recommend repeat DEXA scan in 2024. Encouraged weight bearing activity. Will check 25 vitamin D level.  Follow up in 3-4 months.  Labs drawn in the office today.  Follow up with psychiatry.

## 2024-04-15 NOTE — DATA REVIEWED
[FreeTextEntry1] : Labs 10/24/23 25 vitamin D 25.2 chol 172 HDL 50 LDL 93 tri 149 BUN/cr 21/.8 calcium 10.6 glucose 163 H/H 14.2/47.4   12/26/2020 H/H 14.5/43.8 BUN/cr 18/0.73 calcium 10.3 AST 13 ALT 18

## 2024-04-15 NOTE — HISTORY OF PRESENT ILLNESS
[Continuous Glucose Monitoring] : Continuous Glucose Monitoring: Yes [Addison] : Addison [FreeTextEntry1] : 63 y.o. female with h/o Type 2 DM diagnosed in 2014, hypothyroidism, hyperlipidemia, thyroid nodules and osteopenia here for follow up visit.   Had appendectomy on 12/26/2020. Reports feeling stressed since last visit. She does meet with psychiatrist to get assistance and did ween Celexa and now on Zoloft. Did have COVID-19 in May 2023 while in [place].   No fatigue. Was doing well with diet called Optavia but was off it given GI symptoms. Weight is up since last visit because of increase in snacking and stress eating. Does have sweets. Stopped taking Januvia and glipizide ER. Off Jardiance 25 mg daily because of yeast infection. Intolerant to Metformin, Trulicity and Mounjaro in the past. Stopped Phentermine.   She is using Freestyle Addison 3.  Does get hypoglycemia. UTD with ophthalmology and no retinopathy. No neuropathy. Follows with podiatry. No proteinuria. Following with GI for fatty liver. No exercise. Knee pain is better now, saw orthopedics. Did get steroid injection in left knee this year. Completed PT. C/o shoulder pain and right finger numbness and tingling. Working with therapist 2 times per week.   Takes Humalog 12/12/12 units before meals and Basaglar 40 units SQ QHS  Hyperlipidemia- stopped taking Atorvastatin 20 mg daily for 2 weeks but still having left shoulder pain.    Regarding thyroid disease, no neck complaints. Taking LT4 88 mcg daily. Thyroid sonogram in January 2019 showed stable b/l subcentimeter thyroid nodules with largest in the left lower pole which is 1.2 cm. Thyroid sonogram on 7/8/2020 shows mild increase in size of left lower pole nodule to 1.6 cm. Left mid pole nodule is stable at 0.6 cm. There is a new right 0.7 cm hypoechoic nodule. Thyroid sonogram on 4/19/21 showed stable right 0.8 cm nodule, stable left mid pole 0.7 cm nodule and mild increase in left lower pole nodule to 1.8 cm.  Thyroid sonogram on 9/6/22 showed stable right 0.8 cm hypoechoic nodule and stable left 0.6 cm hypoechoic mid pole nodule and stable left 1.9 cm lower pole nodule. Thyroid sonogram on 9/12/23 shows stable right 0.8 cm nodule and stable left 0.7 cm mid pole nodule and stable 2.0 cm lower pole nodule.   C/o raspy voice at times.   Had FNA on 10/10/23 of left 2.0 cm isoechoic dominant nodule in the lower pole c/w nodular goiter.   Also h/o hypercalcemia most likely primary hyperparathyroidism and osteopenia. No h/o kidney stones. No polyuria and polydipsia. No falls or fractures. Takes vitamin D3 2,000 Iu daily.   DEXA scan in March 2017 showed spine -0.1 and femoral neck -0.7.  DEXA scan in March 2018 showed femoral neck -1.8, total hip -1.0, 1/3 radius -0.8 and spine -0.5.  DEXA scan in 9/2020 showed 1/3 radius -1.2 with 4.1% decrease, spine -0.2 which is stable and left femoral neck -1.1 and total hip -0.4 which are stable.  DEXA scan in 11/2022 showed spine -0.3, femoral neck -1.3 and total hip 0.1. FRAX score 7.3/0.6%  Saw dermatology for hair loss. Hair loss is androgenic in origin. Hair loss has resolved and taking MVI.   UTD with cardiology. No KEYS  Vitamin B12 1,000 mcg daily and magnesium 250 mg daily.  [FreeTextEntry2] : 45 [FreeTextEntry3] : 55 [FreeTextEntry4] : 0 [de-identified] : 7.8%

## 2024-04-16 LAB
25(OH)D3 SERPL-MCNC: 29.4 NG/ML
ALBUMIN SERPL ELPH-MCNC: 4.6 G/DL
ALP BLD-CCNC: 98 U/L
ALT SERPL-CCNC: 51 U/L
ANION GAP SERPL CALC-SCNC: 14 MMOL/L
AST SERPL-CCNC: 31 U/L
BILIRUB SERPL-MCNC: 0.3 MG/DL
BUN SERPL-MCNC: 18 MG/DL
CALCIUM SERPL-MCNC: 10 MG/DL
CALCIUM SERPL-MCNC: 10 MG/DL
CHLORIDE SERPL-SCNC: 99 MMOL/L
CHOLEST SERPL-MCNC: 272 MG/DL
CK SERPL-CCNC: 106 U/L
CO2 SERPL-SCNC: 23 MMOL/L
CREAT SERPL-MCNC: 0.75 MG/DL
CREAT SPEC-SCNC: 29 MG/DL
EGFR: 89 ML/MIN/1.73M2
GLUCOSE SERPL-MCNC: 144 MG/DL
HCT VFR BLD CALC: 43.6 %
HDLC SERPL-MCNC: 56 MG/DL
HGB BLD-MCNC: 14.1 G/DL
LDLC SERPL CALC-MCNC: 173 MG/DL
MAGNESIUM SERPL-MCNC: 2.2 MG/DL
MCHC RBC-ENTMCNC: 29.4 PG
MCHC RBC-ENTMCNC: 32.3 GM/DL
MCV RBC AUTO: 90.8 FL
MICROALBUMIN 24H UR DL<=1MG/L-MCNC: <1.2 MG/DL
MICROALBUMIN/CREAT 24H UR-RTO: NORMAL MG/G
NONHDLC SERPL-MCNC: 216 MG/DL
PARATHYROID HORMONE INTACT: 89 PG/ML
PHOSPHATE SERPL-MCNC: 3.2 MG/DL
PLATELET # BLD AUTO: 241 K/UL
POTASSIUM SERPL-SCNC: 5.1 MMOL/L
PROT SERPL-MCNC: 7.1 G/DL
RBC # BLD: 4.8 M/UL
RBC # FLD: 13.4 %
SODIUM SERPL-SCNC: 137 MMOL/L
T4 FREE SERPL-MCNC: 1.3 NG/DL
TRIGL SERPL-MCNC: 230 MG/DL
TSH SERPL-ACNC: 0.64 UIU/ML
WBC # FLD AUTO: 8.08 K/UL

## 2024-04-16 RX ORDER — ROSUVASTATIN CALCIUM 10 MG/1
10 TABLET, FILM COATED ORAL
Qty: 90 | Refills: 1 | Status: ACTIVE | COMMUNITY
Start: 2024-04-16 | End: 1900-01-01

## 2024-04-22 ENCOUNTER — RX RENEWAL (OUTPATIENT)
Age: 64
End: 2024-04-22

## 2024-04-22 RX ORDER — PEN NEEDLE, DIABETIC 32GX 5/32"
32G X 4 MM NEEDLE, DISPOSABLE MISCELLANEOUS
Qty: 200 | Refills: 6 | Status: ACTIVE | COMMUNITY
Start: 2023-04-03 | End: 1900-01-01

## 2024-05-04 ENCOUNTER — RX RENEWAL (OUTPATIENT)
Age: 64
End: 2024-05-04

## 2024-05-24 RX ORDER — ONDANSETRON 4 MG/1
4 TABLET ORAL
Qty: 30 | Refills: 3 | Status: ACTIVE | COMMUNITY
Start: 2024-05-21 | End: 1900-01-01

## 2024-05-29 RX ORDER — SEMAGLUTIDE 0.68 MG/ML
2 INJECTION, SOLUTION SUBCUTANEOUS
Qty: 3 | Refills: 3 | Status: ACTIVE | COMMUNITY
Start: 2024-05-21

## 2024-08-13 ENCOUNTER — APPOINTMENT (OUTPATIENT)
Dept: ENDOCRINOLOGY | Facility: CLINIC | Age: 64
End: 2024-08-13
Payer: COMMERCIAL

## 2024-08-13 ENCOUNTER — LABORATORY RESULT (OUTPATIENT)
Age: 64
End: 2024-08-13

## 2024-08-13 VITALS
OXYGEN SATURATION: 98 % | WEIGHT: 222 LBS | HEART RATE: 82 BPM | BODY MASS INDEX: 39.34 KG/M2 | HEIGHT: 63 IN | DIASTOLIC BLOOD PRESSURE: 81 MMHG | SYSTOLIC BLOOD PRESSURE: 127 MMHG

## 2024-08-13 DIAGNOSIS — E78.5 HYPERLIPIDEMIA, UNSPECIFIED: ICD-10-CM

## 2024-08-13 DIAGNOSIS — M85.80 OTHER SPECIFIED DISORDERS OF BONE DENSITY AND STRUCTURE, UNSPECIFIED SITE: ICD-10-CM

## 2024-08-13 DIAGNOSIS — E04.2 NONTOXIC MULTINODULAR GOITER: ICD-10-CM

## 2024-08-13 DIAGNOSIS — E66.9 OBESITY, UNSPECIFIED: ICD-10-CM

## 2024-08-13 DIAGNOSIS — E03.9 HYPOTHYROIDISM, UNSPECIFIED: ICD-10-CM

## 2024-08-13 DIAGNOSIS — R41.3 OTHER AMNESIA: ICD-10-CM

## 2024-08-13 DIAGNOSIS — E55.9 VITAMIN D DEFICIENCY, UNSPECIFIED: ICD-10-CM

## 2024-08-13 DIAGNOSIS — E11.9 TYPE 2 DIABETES MELLITUS W/OUT COMPLICATIONS: ICD-10-CM

## 2024-08-13 DIAGNOSIS — E21.0 PRIMARY HYPERPARATHYROIDISM: ICD-10-CM

## 2024-08-13 LAB — HBA1C MFR BLD HPLC: 7

## 2024-08-13 PROCEDURE — 95251 CONT GLUC MNTR ANALYSIS I&R: CPT

## 2024-08-13 PROCEDURE — 99215 OFFICE O/P EST HI 40 MIN: CPT

## 2024-08-13 PROCEDURE — 83036 HEMOGLOBIN GLYCOSYLATED A1C: CPT | Mod: QW

## 2024-08-13 RX ORDER — BLOOD-GLUCOSE SENSOR
EACH MISCELLANEOUS
Qty: 9 | Refills: 3 | Status: ACTIVE | COMMUNITY
Start: 2024-08-13 | End: 1900-01-01

## 2024-08-13 NOTE — HISTORY OF PRESENT ILLNESS
[Continuous Glucose Monitoring] : Continuous Glucose Monitoring: Yes [Addison] : Addison [FreeTextEntry1] : 64 y.o. female with h/o Type 2 DM diagnosed in 2014, hypothyroidism, hyperlipidemia, thyroid nodules and osteopenia here for follow up visit.    Seeing new PCP, Dr. Lilo Yee.  Had appendectomy on 12/26/2020. Reports feeling stressed since last visit. She does meet with psychiatrist to get assistance and did ween Celexa and now on Zoloft. Did have COVID-19 in May 2023 while in [place].   No fatigue. Was doing well with diet called Optavia but stopped it given GI symptoms. Weight is down since last visit. Stopped taking Januvia and glipizide ER. Off Jardiance 25 mg daily because of yeast infection. Intolerant to Metformin, Trulicity and Mounjaro in the past. Stopped Phentermine.   She is using Freestyle Addison 3.  Does get hypoglycemia overnight.   UTD with ophthalmology and no retinopathy. No neuropathy. Follows with podiatry. No proteinuria.   Following with GI for fatty liver. No exercise. Knee pain is better now, saw orthopedics. Did get steroid injection in left knee this year. Completed PT. C/o shoulder pain and right finger numbness and tingling. Working with therapist 2 times per week.   She was taking Humalog 12/12/12 units before meals and Basaglar 40 units SQ QHS but stopped secondary to hypoglycemia.   Takes Ozempic 0.5 mg SQ weekly but does have some nausea.   Hyperlipidemia- stopped taking Atorvastatin 20 mg daily for 2 weeks but still having left shoulder pain. Now taking Rosuvastatin 10 mg daily.   Regarding thyroid disease, no neck complaints. Taking LT4 88 mcg daily.   Thyroid sonogram in January 2019 showed stable b/l subcentimeter thyroid nodules with largest in the left lower pole which is 1.2 cm.  Thyroid sonogram on 7/8/2020 shows mild increase in size of left lower pole nodule to 1.6 cm. Left mid pole nodule is stable at 0.6 cm. There is a new right 0.7 cm hypoechoic nodule.  Thyroid sonogram on 4/19/21 showed stable right 0.8 cm nodule, stable left mid pole 0.7 cm nodule and mild increase in left lower pole nodule to 1.8 cm.   Thyroid sonogram on 9/6/22 showed stable right 0.8 cm hypoechoic nodule and stable left 0.6 cm hypoechoic mid pole nodule and stable left 1.9 cm lower pole nodule.  Thyroid sonogram on 9/12/23 shows stable right 0.8 cm nodule and stable left 0.7 cm mid pole nodule and stable 2.0 cm lower pole nodule.   C/o raspy voice at times.   Had FNA on 10/10/23 of left 2.0 cm isoechoic dominant nodule in the lower pole c/w nodular goiter.   Also h/o hypercalcemia most likely primary hyperparathyroidism and osteopenia. No h/o kidney stones. No polyuria and polydipsia. No falls or fractures. Takes vitamin D3 2,000 Iu daily.   DEXA scan in March 2017 showed spine -0.1 and femoral neck -0.7.  DEXA scan in March 2018 showed femoral neck -1.8, total hip -1.0, 1/3 radius -0.8 and spine -0.5.  DEXA scan in 9/2020 showed 1/3 radius -1.2 with 4.1% decrease, spine -0.2 which is stable and left femoral neck -1.1 and total hip -0.4 which are stable.  DEXA scan in 11/2022 showed spine -0.3, femoral neck -1.3 and total hip 0.1. FRAX score 7.3/0.6%  Saw dermatology for hair loss. Hair loss is androgenic in origin. Hair loss has resolved and taking MVI.   UTD with cardiology. No KEYS.   Vitamin B12 1,000 mcg daily and vitamin D 2,000 IU daily  [FreeTextEntry2] : 64 [FreeTextEntry3] : 36 [FreeTextEntry4] : 0 [de-identified] : 7.5%

## 2024-08-13 NOTE — ASSESSMENT
[Carbohydrate Consistent Diet] : carbohydrate consistent diet [Diabetes Foot Care] : diabetes foot care [Long Term Vascular Complications] : long term vascular complications of diabetes [Importance of Diet and Exercise] : importance of diet and exercise to improve glycemic control, achieve weight loss and improve cardiovascular health [FreeTextEntry1] : 64 y.o. female with h/o Type 2 DM, hyperlipidemia, hypothyroidism and thyroid nodules.  1. Type 2 DM- Good control with Hba1c of 7% today. Encouraged a carbohydrate consistent diet and exercise. Freestyle Addison download reviewed with 64% in range and 36% high. Will start Basaglar 20 units QHS. Will remain off Humalog, Januvia and glipizide ER. Intolerant to Metformin, Trulicity and Mounjaro. Will continue Ozempic 0.5 mg SQ weekly. She prefers not to titrate up Ozempic since she is still experiencing nausea. Reviewed risks and benefits of GLP-1 Herbert including GI side effects, pancreatitis and MTC. Will check CMP and urine alb/cr ratio.  2. BP is at goal today. Encouraged low sodium diet, exercise and weight loss.  3. Hyperlipidemia- Will check lipids and will continue Rosuvastatin 10 mg daily.  4. Hypothyroidism- Patient appears euthyroid and will continue LT4 88 mcg daily for now. Will check TFTs.  5. Thyroid nodules- Given nodule stability and benign FNA of left lower pole nodule, will monitor for now and repeat thyroid ultrasound in the fall of 2024.  6. Vitamin D def/Primary hyperparathyroidism- Will check serum calcium and phosphorus with intact PTH and 25 vitamin D level. Will continue vitamin D3 2,000 IU daily. May need to consider surgery given hypercalcemia.   7. Obesity- Encouraged exercise and portion control. Intolerant to Metformin and GLP-1 Herbert (Trulicity and Mounjaro). Not interested in bariatric surgery. Will continue Ozempic 0.5 mg SQ weekly. Will check midnight salivary cortisol.   8. Osteopenia- Average risk of fracture. Will monitor for now. Recommend repeat DEXA scan in the fall of 2024. Encouraged weight bearing activity. Will check 25 vitamin D level.  Follow up in 3-4 months.  Labs drawn in the office today.  Follow up with psychiatry and PCP.

## 2024-08-13 NOTE — REVIEW OF SYSTEMS
[Joint Pain] : joint pain [Pain/Numbness of Digits] : pain/numbness of digits [Anxiety] : anxiety [Stress] : stress [Negative] : Endocrine [Recent Weight Loss (___ Lbs)] : recent weight loss: [unfilled] lbs [Fatigue] : no fatigue [Recent Weight Gain (___ Lbs)] : no recent weight gain [Dysphagia] : no dysphagia [Neck Pain] : no neck pain [Dysphonia] : no dysphonia [Polyuria] : no polyuria [Hair Loss] : no hair loss [Swelling] : no swelling

## 2024-08-13 NOTE — HISTORY OF PRESENT ILLNESS
[Continuous Glucose Monitoring] : Continuous Glucose Monitoring: Yes [Addison] : Addison [FreeTextEntry1] : 64 y.o. female with h/o Type 2 DM diagnosed in 2014, hypothyroidism, hyperlipidemia, thyroid nodules and osteopenia here for follow up visit.    Seeing new PCP, Dr. Lilo Yee.  Had appendectomy on 12/26/2020. Reports feeling stressed since last visit. She does meet with psychiatrist to get assistance and did ween Celexa and now on Zoloft. Did have COVID-19 in May 2023 while in [place].   No fatigue. Was doing well with diet called Optavia but stopped it given GI symptoms. Weight is down since last visit. Stopped taking Januvia and glipizide ER. Off Jardiance 25 mg daily because of yeast infection. Intolerant to Metformin, Trulicity and Mounjaro in the past. Stopped Phentermine.   She is using Freestyle Addison 3.  Does get hypoglycemia overnight.   UTD with ophthalmology and no retinopathy. No neuropathy. Follows with podiatry. No proteinuria.   Following with GI for fatty liver. No exercise. Knee pain is better now, saw orthopedics. Did get steroid injection in left knee this year. Completed PT. C/o shoulder pain and right finger numbness and tingling. Working with therapist 2 times per week.   She was taking Humalog 12/12/12 units before meals and Basaglar 40 units SQ QHS but stopped secondary to hypoglycemia.   Takes Ozempic 0.5 mg SQ weekly but does have some nausea.   Hyperlipidemia- stopped taking Atorvastatin 20 mg daily for 2 weeks but still having left shoulder pain. Now taking Rosuvastatin 10 mg daily.   Regarding thyroid disease, no neck complaints. Taking LT4 88 mcg daily.   Thyroid sonogram in January 2019 showed stable b/l subcentimeter thyroid nodules with largest in the left lower pole which is 1.2 cm.  Thyroid sonogram on 7/8/2020 shows mild increase in size of left lower pole nodule to 1.6 cm. Left mid pole nodule is stable at 0.6 cm. There is a new right 0.7 cm hypoechoic nodule.  Thyroid sonogram on 4/19/21 showed stable right 0.8 cm nodule, stable left mid pole 0.7 cm nodule and mild increase in left lower pole nodule to 1.8 cm.   Thyroid sonogram on 9/6/22 showed stable right 0.8 cm hypoechoic nodule and stable left 0.6 cm hypoechoic mid pole nodule and stable left 1.9 cm lower pole nodule.  Thyroid sonogram on 9/12/23 shows stable right 0.8 cm nodule and stable left 0.7 cm mid pole nodule and stable 2.0 cm lower pole nodule.   C/o raspy voice at times.   Had FNA on 10/10/23 of left 2.0 cm isoechoic dominant nodule in the lower pole c/w nodular goiter.   Also h/o hypercalcemia most likely primary hyperparathyroidism and osteopenia. No h/o kidney stones. No polyuria and polydipsia. No falls or fractures. Takes vitamin D3 2,000 Iu daily.   DEXA scan in March 2017 showed spine -0.1 and femoral neck -0.7.  DEXA scan in March 2018 showed femoral neck -1.8, total hip -1.0, 1/3 radius -0.8 and spine -0.5.  DEXA scan in 9/2020 showed 1/3 radius -1.2 with 4.1% decrease, spine -0.2 which is stable and left femoral neck -1.1 and total hip -0.4 which are stable.  DEXA scan in 11/2022 showed spine -0.3, femoral neck -1.3 and total hip 0.1. FRAX score 7.3/0.6%  Saw dermatology for hair loss. Hair loss is androgenic in origin. Hair loss has resolved and taking MVI.   UTD with cardiology. No KEYS.   Vitamin B12 1,000 mcg daily and vitamin D 2,000 IU daily  [FreeTextEntry2] : 63 [FreeTextEntry3] : 36 [FreeTextEntry4] : 0 [de-identified] : 7.5%

## 2024-08-14 LAB
25(OH)D3 SERPL-MCNC: 31.4 NG/ML
ALBUMIN SERPL ELPH-MCNC: 5 G/DL
ALP BLD-CCNC: 104 U/L
ALT SERPL-CCNC: 46 U/L
ANION GAP SERPL CALC-SCNC: 16 MMOL/L
AST SERPL-CCNC: 27 U/L
BASOPHILS # BLD AUTO: 0.03 K/UL
BASOPHILS NFR BLD AUTO: 0.3 %
BILIRUB SERPL-MCNC: 0.4 MG/DL
BUN SERPL-MCNC: 17 MG/DL
CALCIUM SERPL-MCNC: 11 MG/DL
CALCIUM SERPL-MCNC: 11 MG/DL
CHLORIDE SERPL-SCNC: 98 MMOL/L
CHOLEST SERPL-MCNC: 159 MG/DL
CO2 SERPL-SCNC: 24 MMOL/L
CREAT SERPL-MCNC: 0.77 MG/DL
CREAT SPEC-SCNC: 239 MG/DL
EGFR: 86 ML/MIN/1.73M2
EOSINOPHIL # BLD AUTO: 0.12 K/UL
EOSINOPHIL NFR BLD AUTO: 1.3 %
FOLATE SERPL-MCNC: 6.6 NG/ML
GLUCOSE SERPL-MCNC: 172 MG/DL
HCT VFR BLD CALC: 48.3 %
HDLC SERPL-MCNC: 47 MG/DL
HGB BLD-MCNC: 15 G/DL
IMM GRANULOCYTES NFR BLD AUTO: 0.2 %
LDLC SERPL CALC-MCNC: 80 MG/DL
LYMPHOCYTES # BLD AUTO: 1.59 K/UL
LYMPHOCYTES NFR BLD AUTO: 17 %
MAGNESIUM SERPL-MCNC: 2.2 MG/DL
MAN DIFF?: NORMAL
MCHC RBC-ENTMCNC: 28.9 PG
MCHC RBC-ENTMCNC: 31.1 GM/DL
MCV RBC AUTO: 93.1 FL
MICROALBUMIN 24H UR DL<=1MG/L-MCNC: <1.2 MG/DL
MICROALBUMIN/CREAT 24H UR-RTO: NORMAL MG/G
MONOCYTES # BLD AUTO: 0.48 K/UL
MONOCYTES NFR BLD AUTO: 5.1 %
NEUTROPHILS # BLD AUTO: 7.09 K/UL
NEUTROPHILS NFR BLD AUTO: 76.1 %
NONHDLC SERPL-MCNC: 112 MG/DL
PARATHYROID HORMONE INTACT: 69 PG/ML
PHOSPHATE SERPL-MCNC: 3.3 MG/DL
PLATELET # BLD AUTO: 270 K/UL
POTASSIUM SERPL-SCNC: 5.2 MMOL/L
PROT SERPL-MCNC: 7.3 G/DL
RBC # BLD: 5.19 M/UL
RBC # FLD: 14.6 %
SODIUM SERPL-SCNC: 137 MMOL/L
T4 FREE SERPL-MCNC: 1.5 NG/DL
TRIGL SERPL-MCNC: 187 MG/DL
TSH SERPL-ACNC: 0.79 UIU/ML
VIT B12 SERPL-MCNC: 1799 PG/ML
WBC # FLD AUTO: 9.33 K/UL

## 2024-10-14 ENCOUNTER — RX RENEWAL (OUTPATIENT)
Age: 64
End: 2024-10-14

## 2024-10-14 NOTE — ED STATDOCS - NS ED SCRIBE STATEMENT
Discharge instructions reviewed with patient and wife. PIV removed, catheter intact. Site to right radial WDL. Discharged home.   
Attending

## 2024-12-16 ENCOUNTER — APPOINTMENT (OUTPATIENT)
Dept: ENDOCRINOLOGY | Facility: CLINIC | Age: 64
End: 2024-12-16
Payer: COMMERCIAL

## 2024-12-16 VITALS
BODY MASS INDEX: 37.21 KG/M2 | HEIGHT: 63 IN | SYSTOLIC BLOOD PRESSURE: 129 MMHG | HEART RATE: 86 BPM | WEIGHT: 210 LBS | OXYGEN SATURATION: 99 % | DIASTOLIC BLOOD PRESSURE: 82 MMHG

## 2024-12-16 DIAGNOSIS — E21.0 PRIMARY HYPERPARATHYROIDISM: ICD-10-CM

## 2024-12-16 DIAGNOSIS — E04.2 NONTOXIC MULTINODULAR GOITER: ICD-10-CM

## 2024-12-16 DIAGNOSIS — E11.9 TYPE 2 DIABETES MELLITUS W/OUT COMPLICATIONS: ICD-10-CM

## 2024-12-16 DIAGNOSIS — M85.80 OTHER SPECIFIED DISORDERS OF BONE DENSITY AND STRUCTURE, UNSPECIFIED SITE: ICD-10-CM

## 2024-12-16 DIAGNOSIS — E03.9 HYPOTHYROIDISM, UNSPECIFIED: ICD-10-CM

## 2024-12-16 DIAGNOSIS — E66.9 OBESITY, UNSPECIFIED: ICD-10-CM

## 2024-12-16 DIAGNOSIS — E78.5 HYPERLIPIDEMIA, UNSPECIFIED: ICD-10-CM

## 2024-12-16 LAB — HBA1C MFR BLD HPLC: 7.2

## 2024-12-16 PROCEDURE — 95251 CONT GLUC MNTR ANALYSIS I&R: CPT

## 2024-12-16 PROCEDURE — 99215 OFFICE O/P EST HI 40 MIN: CPT

## 2024-12-16 PROCEDURE — 83036 HEMOGLOBIN GLYCOSYLATED A1C: CPT | Mod: QW

## 2024-12-16 NOTE — QUALITY MEASURES
Here s/p fall. Not appropriate for cardiac rehab at this time. Will sign off.     [Visual inspection, sensory exam] : Foot exam, including visual inspection, sensory exam with mono filament, and pulse exam, was performed within the last 12 months

## 2024-12-17 LAB
25(OH)D3 SERPL-MCNC: 31.4 NG/ML
ALBUMIN SERPL ELPH-MCNC: 4.8 G/DL
ALP BLD-CCNC: 96 U/L
ALT SERPL-CCNC: 44 U/L
ANION GAP SERPL CALC-SCNC: 15 MMOL/L
AST SERPL-CCNC: 27 U/L
BILIRUB SERPL-MCNC: 0.4 MG/DL
BUN SERPL-MCNC: 18 MG/DL
CALCIUM SERPL-MCNC: 10.7 MG/DL
CALCIUM SERPL-MCNC: 10.7 MG/DL
CHLORIDE SERPL-SCNC: 101 MMOL/L
CHOLEST SERPL-MCNC: 161 MG/DL
CO2 SERPL-SCNC: 24 MMOL/L
CREAT SERPL-MCNC: 0.75 MG/DL
CREAT SPEC-SCNC: 156 MG/DL
EGFR: 88 ML/MIN/1.73M2
FOLATE SERPL-MCNC: 7.1 NG/ML
GLUCOSE SERPL-MCNC: 127 MG/DL
HCT VFR BLD CALC: 44.2 %
HDLC SERPL-MCNC: 50 MG/DL
HGB BLD-MCNC: 14.5 G/DL
LDLC SERPL CALC-MCNC: 78 MG/DL
MCHC RBC-ENTMCNC: 28.7 PG
MCHC RBC-ENTMCNC: 32.8 G/DL
MCV RBC AUTO: 87.4 FL
MICROALBUMIN 24H UR DL<=1MG/L-MCNC: <1.2 MG/DL
MICROALBUMIN/CREAT 24H UR-RTO: NORMAL MG/G
NONHDLC SERPL-MCNC: 111 MG/DL
PARATHYROID HORMONE INTACT: 63 PG/ML
PHOSPHATE SERPL-MCNC: 4 MG/DL
PLATELET # BLD AUTO: 235 K/UL
POTASSIUM SERPL-SCNC: 5 MMOL/L
PROT SERPL-MCNC: 7.3 G/DL
RBC # BLD: 5.06 M/UL
RBC # FLD: 13.4 %
SODIUM SERPL-SCNC: 140 MMOL/L
T4 FREE SERPL-MCNC: 1.5 NG/DL
TRIGL SERPL-MCNC: 192 MG/DL
TSH SERPL-ACNC: 0.44 UIU/ML
VIT B12 SERPL-MCNC: 1604 PG/ML
WBC # FLD AUTO: 10.92 K/UL

## 2025-01-13 RX ORDER — BLOOD-GLUCOSE SENSOR
EACH MISCELLANEOUS
Qty: 6 | Refills: 2 | Status: ACTIVE | COMMUNITY
Start: 2025-01-13 | End: 1900-01-01

## 2025-01-16 ENCOUNTER — NON-APPOINTMENT (OUTPATIENT)
Age: 65
End: 2025-01-16

## 2025-01-22 ENCOUNTER — APPOINTMENT (OUTPATIENT)
Dept: ENDOCRINOLOGY | Facility: CLINIC | Age: 65
End: 2025-01-22

## 2025-01-27 ENCOUNTER — TRANSCRIPTION ENCOUNTER (OUTPATIENT)
Age: 65
End: 2025-01-27

## 2025-01-27 NOTE — QUALITY MEASURES
(0) No drift; leg holds 30 degree position for full 5 secs
[Visual inspection, sensory exam] : Foot exam, including visual inspection, sensory exam with mono filament, and pulse exam, was performed within the last 12 months

## 2025-02-18 ENCOUNTER — TRANSCRIPTION ENCOUNTER (OUTPATIENT)
Age: 65
End: 2025-02-18

## 2025-03-31 NOTE — ED ADULT NURSE NOTE - NSFALLOOBATTEMPT_ED_ALL_ED
Rusty Stout accompanied Camila Alejandre to the emergency department on 3/31/2025.    Return date if applicable: 04/02/2025        If you have any questions or concerns, please don't hesitate to call.      Sunita Evans MD
No

## 2025-04-07 ENCOUNTER — RX RENEWAL (OUTPATIENT)
Age: 65
End: 2025-04-07

## 2025-04-29 ENCOUNTER — APPOINTMENT (OUTPATIENT)
Dept: ENDOCRINOLOGY | Facility: CLINIC | Age: 65
End: 2025-04-29
Payer: COMMERCIAL

## 2025-04-29 VITALS — DIASTOLIC BLOOD PRESSURE: 80 MMHG | SYSTOLIC BLOOD PRESSURE: 120 MMHG

## 2025-04-29 VITALS
BODY MASS INDEX: 36.32 KG/M2 | WEIGHT: 205 LBS | HEART RATE: 89 BPM | HEIGHT: 63 IN | DIASTOLIC BLOOD PRESSURE: 82 MMHG | RESPIRATION RATE: 16 BRPM | OXYGEN SATURATION: 96 % | SYSTOLIC BLOOD PRESSURE: 133 MMHG

## 2025-04-29 DIAGNOSIS — E55.9 VITAMIN D DEFICIENCY, UNSPECIFIED: ICD-10-CM

## 2025-04-29 DIAGNOSIS — E21.0 PRIMARY HYPERPARATHYROIDISM: ICD-10-CM

## 2025-04-29 DIAGNOSIS — E03.9 HYPOTHYROIDISM, UNSPECIFIED: ICD-10-CM

## 2025-04-29 DIAGNOSIS — E78.5 HYPERLIPIDEMIA, UNSPECIFIED: ICD-10-CM

## 2025-04-29 DIAGNOSIS — E66.9 OBESITY, UNSPECIFIED: ICD-10-CM

## 2025-04-29 DIAGNOSIS — E04.2 NONTOXIC MULTINODULAR GOITER: ICD-10-CM

## 2025-04-29 DIAGNOSIS — M85.80 OTHER SPECIFIED DISORDERS OF BONE DENSITY AND STRUCTURE, UNSPECIFIED SITE: ICD-10-CM

## 2025-04-29 DIAGNOSIS — E11.9 TYPE 2 DIABETES MELLITUS W/OUT COMPLICATIONS: ICD-10-CM

## 2025-04-29 LAB — HBA1C MFR BLD HPLC: 6.4

## 2025-04-29 PROCEDURE — 83036 HEMOGLOBIN GLYCOSYLATED A1C: CPT | Mod: QW

## 2025-04-29 PROCEDURE — 95251 CONT GLUC MNTR ANALYSIS I&R: CPT

## 2025-04-29 PROCEDURE — 99215 OFFICE O/P EST HI 40 MIN: CPT

## 2025-04-30 LAB
25(OH)D3 SERPL-MCNC: 32.7 NG/ML
ALBUMIN SERPL ELPH-MCNC: 4.9 G/DL
ALP BLD-CCNC: 96 U/L
ALT SERPL-CCNC: 44 U/L
ANION GAP SERPL CALC-SCNC: 14 MMOL/L
AST SERPL-CCNC: 31 U/L
BASOPHILS # BLD AUTO: 0.06 K/UL
BASOPHILS NFR BLD AUTO: 0.6 %
BILIRUB SERPL-MCNC: 0.3 MG/DL
BUN SERPL-MCNC: 19 MG/DL
CALCIUM SERPL-MCNC: 10.5 MG/DL
CALCIUM SERPL-MCNC: 10.5 MG/DL
CHLORIDE SERPL-SCNC: 102 MMOL/L
CHOLEST SERPL-MCNC: 178 MG/DL
CO2 SERPL-SCNC: 22 MMOL/L
CREAT SERPL-MCNC: 0.71 MG/DL
CREAT SPEC-SCNC: 110 MG/DL
EGFRCR SERPLBLD CKD-EPI 2021: 94 ML/MIN/1.73M2
EOSINOPHIL # BLD AUTO: 0.15 K/UL
EOSINOPHIL NFR BLD AUTO: 1.4 %
GLUCOSE SERPL-MCNC: 114 MG/DL
HCT VFR BLD CALC: 45.2 %
HDLC SERPL-MCNC: 45 MG/DL
HGB BLD-MCNC: 14.7 G/DL
IMM GRANULOCYTES NFR BLD AUTO: 0.2 %
LDLC SERPL-MCNC: 94 MG/DL
LYMPHOCYTES # BLD AUTO: 2.37 K/UL
LYMPHOCYTES NFR BLD AUTO: 22.8 %
MAN DIFF?: NORMAL
MCHC RBC-ENTMCNC: 28.5 PG
MCHC RBC-ENTMCNC: 32.5 G/DL
MCV RBC AUTO: 87.8 FL
MICROALBUMIN 24H UR DL<=1MG/L-MCNC: <1.2 MG/DL
MICROALBUMIN/CREAT 24H UR-RTO: NORMAL MG/G
MONOCYTES # BLD AUTO: 0.6 K/UL
MONOCYTES NFR BLD AUTO: 5.8 %
NEUTROPHILS # BLD AUTO: 7.19 K/UL
NEUTROPHILS NFR BLD AUTO: 69.2 %
NONHDLC SERPL-MCNC: 133 MG/DL
PARATHYROID HORMONE INTACT: 67 PG/ML
PHOSPHATE SERPL-MCNC: 3.6 MG/DL
PLATELET # BLD AUTO: 252 K/UL
POTASSIUM SERPL-SCNC: 4.7 MMOL/L
PROT SERPL-MCNC: 7.2 G/DL
RBC # BLD: 5.15 M/UL
RBC # FLD: 14.1 %
SODIUM SERPL-SCNC: 137 MMOL/L
T4 FREE SERPL-MCNC: 1.3 NG/DL
TRIGL SERPL-MCNC: 227 MG/DL
TSH SERPL-ACNC: 0.54 UIU/ML
WBC # FLD AUTO: 10.39 K/UL

## 2025-05-10 ENCOUNTER — NON-APPOINTMENT (OUTPATIENT)
Age: 65
End: 2025-05-10

## 2025-08-04 ENCOUNTER — RX RENEWAL (OUTPATIENT)
Age: 65
End: 2025-08-04